# Patient Record
Sex: FEMALE | Race: WHITE | NOT HISPANIC OR LATINO | Employment: FULL TIME
[De-identification: names, ages, dates, MRNs, and addresses within clinical notes are randomized per-mention and may not be internally consistent; named-entity substitution may affect disease eponyms.]

---

## 2018-06-22 ENCOUNTER — TRANSCRIBE ORDERS (OUTPATIENT)
Dept: SCHEDULING | Age: 48
End: 2018-06-22

## 2018-06-22 DIAGNOSIS — N93.8 OTHER SPECIFIED ABNORMAL UTERINE AND VAGINAL BLEEDING: Primary | ICD-10-CM

## 2018-07-03 ENCOUNTER — TRANSCRIBE ORDERS (OUTPATIENT)
Dept: RADIOLOGY | Facility: HOSPITAL | Age: 48
End: 2018-07-03

## 2018-07-03 ENCOUNTER — HOSPITAL ENCOUNTER (OUTPATIENT)
Dept: RADIOLOGY | Facility: HOSPITAL | Age: 48
Discharge: HOME | End: 2018-07-03
Attending: NURSE PRACTITIONER
Payer: COMMERCIAL

## 2018-07-03 DIAGNOSIS — N93.8 OTHER SPECIFIED ABNORMAL UTERINE AND VAGINAL BLEEDING: ICD-10-CM

## 2018-07-03 PROCEDURE — 76856 US EXAM PELVIC COMPLETE: CPT

## 2019-03-28 ENCOUNTER — TELEPHONE (OUTPATIENT)
Dept: COLORECTAL SURGERY | Facility: CLINIC | Age: 49
End: 2019-03-28

## 2019-03-28 NOTE — TELEPHONE ENCOUNTER
Pt left vm wanting to schedule 2 year repeat FFC - due in June 2019. Returned pts call, no answer, message left asking pt to call back to get sched.

## 2019-05-07 ENCOUNTER — TELEPHONE (OUTPATIENT)
Dept: COLORECTAL SURGERY | Facility: CLINIC | Age: 49
End: 2019-05-07

## 2019-05-07 NOTE — TELEPHONE ENCOUNTER
Patient called stating that she is having symptoms of a reoccurance of Colon CA..       1.Over the past couple weeks she has been experiencing these  Symptoms     1. Blood when she has a bowel movement.   2. Bowel movements have been not been consistent in terms of texture    3. Frequency of bowel movement  is not consistent. Some days she has only one and other days she may experience five or six movements a day.     4.  Bowel movements have been long and stringy.    She has [facility] scheduled for 07/31 but is getting nervous.     Wild call to advise: [phone]

## 2019-05-09 NOTE — TELEPHONE ENCOUNTER
Spoke with pt who reiterated symptoms, pt does not take a fiber supplement, she denies unexplained weight loss but does report fatigue which could be associated with depression for which she is being treated. Pt reports she will be seeing her medical oncologist Monday, advised to explain symptoms to them and if they think pt should be seen in the office or for FFC sooner we will accomodate her. Pt will call with update next week.

## 2019-05-13 NOTE — TELEPHONE ENCOUNTER
Pts FFC moved up from 7/31/19 to 7/3/19. Pt states her bloodwork from oncologist came back normal and she feels much more at ease. Pt will call with any further concerns.

## 2019-05-13 NOTE — TELEPHONE ENCOUNTER
Spoke with pt who reports she saw her medical oncologist, Dr Peterson recommends moving up FFC. Advised I will have our  reach out to her in the next 1-2 days.

## 2019-05-20 NOTE — TELEPHONE ENCOUNTER
Schedule for 6/12/19 [facility] opened as of 5/20/19. called pt to see if she would like to move up to 6/12/19. Pt will talk to her boss & call back this afternoon with decision.

## 2019-06-06 ENCOUNTER — TELEPHONE (OUTPATIENT)
Dept: COLORECTAL SURGERY | Facility: CLINIC | Age: 49
End: 2019-06-06

## 2019-06-06 NOTE — TELEPHONE ENCOUNTER
Pt left msg yesterday stating that her dermatologist has her on a course of prednisone 10mg due to severe contact dermatitis. Pt asking if there are any problems with her taking this with her upcoming FFC 6/12/19.    Spoke with Dr. Hunt who reports that there is no problem with taking the prednisone and that on the day of the FFC, the pt may take her dose as directed with a small sip of water.    Called & spoke to pt to relay the above info. Pt understands and will call with any further questions.

## 2019-06-12 ENCOUNTER — ANESTHESIA EVENT (OUTPATIENT)
Dept: ENDOSCOPY | Facility: HOSPITAL | Age: 49
End: 2019-06-12
Payer: COMMERCIAL

## 2019-06-12 ENCOUNTER — HOSPITAL ENCOUNTER (OUTPATIENT)
Facility: HOSPITAL | Age: 49
Discharge: HOME | End: 2019-06-12
Attending: COLON & RECTAL SURGERY | Admitting: COLON & RECTAL SURGERY
Payer: COMMERCIAL

## 2019-06-12 ENCOUNTER — ANESTHESIA (OUTPATIENT)
Dept: ENDOSCOPY | Facility: HOSPITAL | Age: 49
End: 2019-06-12
Payer: COMMERCIAL

## 2019-06-12 VITALS
HEART RATE: 82 BPM | HEIGHT: 68 IN | SYSTOLIC BLOOD PRESSURE: 125 MMHG | WEIGHT: 205 LBS | OXYGEN SATURATION: 97 % | BODY MASS INDEX: 31.07 KG/M2 | TEMPERATURE: 98 F | DIASTOLIC BLOOD PRESSURE: 71 MMHG | RESPIRATION RATE: 30 BRPM

## 2019-06-12 LAB
B-HCG UR QL: NEGATIVE
POCT TEST: NORMAL

## 2019-06-12 PROCEDURE — 0DJD8ZZ INSPECTION OF LOWER INTESTINAL TRACT, VIA NATURAL OR ARTIFICIAL OPENING ENDOSCOPIC: ICD-10-PCS | Performed by: COLON & RECTAL SURGERY

## 2019-06-12 PROCEDURE — 75000014 HC COLONSCOPY DIAGNOSTIC: Performed by: COLON & RECTAL SURGERY

## 2019-06-12 PROCEDURE — 37000002 HC ANESTHESIA MAC: Performed by: COLON & RECTAL SURGERY

## 2019-06-12 PROCEDURE — 25800000 HC PHARMACY IV SOLUTIONS: Performed by: NURSE ANESTHETIST, CERTIFIED REGISTERED

## 2019-06-12 PROCEDURE — 63600000 HC DRUGS/DETAIL CODE: Performed by: NURSE ANESTHETIST, CERTIFIED REGISTERED

## 2019-06-12 PROCEDURE — G0105 COLORECTAL SCRN; HI RISK IND: HCPCS | Performed by: COLON & RECTAL SURGERY

## 2019-06-12 RX ORDER — PROPOFOL 200MG/20ML
SYRINGE (ML) INTRAVENOUS AS NEEDED
Status: DISCONTINUED | OUTPATIENT
Start: 2019-06-12 | End: 2019-06-12 | Stop reason: SURG

## 2019-06-12 RX ORDER — SODIUM CHLORIDE 9 MG/ML
INJECTION, SOLUTION INTRAVENOUS CONTINUOUS PRN
Status: DISCONTINUED | OUTPATIENT
Start: 2019-06-12 | End: 2019-06-12 | Stop reason: SURG

## 2019-06-12 RX ORDER — PREDNISONE 10 MG/1
TABLET ORAL
Refills: 0 | COMMUNITY
Start: 2019-05-30 | End: 2019-07-15 | Stop reason: ALTCHOICE

## 2019-06-12 RX ORDER — DROSPIRENONE AND ETHINYL ESTRADIOL 0.03MG-3MG
KIT ORAL
Refills: 4 | COMMUNITY
Start: 2019-06-03 | End: 2021-06-28

## 2019-06-12 RX ORDER — ESCITALOPRAM OXALATE 20 MG/1
20 TABLET ORAL NIGHTLY
Refills: 5 | COMMUNITY
Start: 2019-06-03

## 2019-06-12 RX ORDER — PROPOFOL 10 MG/ML
INJECTION, EMULSION INTRAVENOUS CONTINUOUS PRN
Status: DISCONTINUED | OUTPATIENT
Start: 2019-06-12 | End: 2019-06-12 | Stop reason: SURG

## 2019-06-12 RX ADMIN — SODIUM CHLORIDE: 9 INJECTION, SOLUTION INTRAVENOUS at 08:17

## 2019-06-12 RX ADMIN — PROPOFOL 50 MG: 10 INJECTION, EMULSION INTRAVENOUS at 08:26

## 2019-06-12 RX ADMIN — PROPOFOL 30 MG: 10 INJECTION, EMULSION INTRAVENOUS at 08:39

## 2019-06-12 RX ADMIN — PROPOFOL 125 MCG/KG/MIN: 10 INJECTION, EMULSION INTRAVENOUS at 08:26

## 2019-06-12 RX ADMIN — PROPOFOL 50 MG: 10 INJECTION, EMULSION INTRAVENOUS at 08:27

## 2019-06-12 RX ADMIN — PROPOFOL 50 MG: 10 INJECTION, EMULSION INTRAVENOUS at 08:35

## 2019-06-12 ASSESSMENT — PAIN - FUNCTIONAL ASSESSMENT
PAIN_FUNCTIONAL_ASSESSMENT: NO/DENIES PAIN

## 2019-06-12 NOTE — H&P
General Surgery History and Physical    Chief Complaint: No chief complaint on file.  .  History of colon cancer    HPI     Patient is a 48 y.o. female who presents with no symptoms history of T3N2 sigmoid cancer for surveillance colonoscopy.     Medical History:   Past Medical History:   Diagnosis Date   • Cancer of sigmoid (CMS/HCC) (HCC)     T3N2       Surgical History:   Past Surgical History:   Procedure Laterality Date   •  SECTION     • COLON SURGERY  05/15/2013    Colonoscopy to the base of the cecum. Limited ileoscopy, biopsy, flat area of the base of the cecum.    • COLON SURGERY  2015    Colonoscopy to the base of the cecum, normal anastomosis.   • COLON SURGERY  2011    Colonoscopy to the base of the cecum   • COLON SURGERY  07/15/2009    Colonoscopy to the base of the cecum   • COLON SURGERY  2008    Colonoscopy to the base of the cecum   • COLON SURGERY  2008    Exploratory laparoscopy with laparoscopic Tequila's resection, takedown of the splenic flexure, resection of residual sigmoid colon,portion of rectum with descending colorectal anastomosis.    • COLON SURGERY  2007    Total colonoscopy via stoma and flexible sigmoidoscopy per rectum with cold forceps biopsy.    • COLON SURGERY  2017    Colonoscopy to the base of the cecum       Social History:   Social History     Social History Narrative   • No narrative on file       Family History:   Family History   Problem Relation Age of Onset   • Colon cancer Paternal Grandfather        Allergies: Penicillins and Sulfa (sulfonamide antibiotics)    Home Medications:  •  drospirenone-ethinyl estradiol,   •  escitalopram,   •  predniSONE,     Current Medications:      Review of Systems  All other systems reviewed and negative except as noted in the HPI.    Objective     Vital Signs for the last 24 hours:  Temp:  [36.7 °C (98 °F)] 36.7 °C (98 °F)  Heart Rate:  [71-85] 71  Resp:  [14-16] 14  BP: (105-130)/(58-84)  105/58    Physicial Exam    General appearance: alert, appears stated age and cooperative  Head: normocephalic, without obvious abnormality, atraumatic  Eyes: conjunctivae/corneas clear. PERRL, EOM's intact. Fundi benign.  Ears: normal TM's and external ear canals both ears  Nose: Nares normal. Septum midline. Mucosa normal. No drainage or sinus tenderness.  Throat: lips, mucosa, and tongue normal; teeth and gums normal  Neck: no adenopathy, no carotid bruit, no JVD, supple, symmetrical, trachea midline and thyroid not enlarged, symmetric, no tenderness/mass/nodules  Back: symmetric, no curvature. ROM normal. No CVA tenderness.  Lungs: clear to auscultation bilaterally  Chest wall: no tenderness  Heart: regular rate and rhythm, S1, S2 normal, no murmur, click, rub or gallop  Abdomen: soft, non-tender; bowel sounds normal; no masses, no organomegaly  Genitalia: normal external genitalia, no erythema, no discharge  Rectal: normal tone, no masses or tenderness  Extremities: extremities normal, warm and well-perfused; no cyanosis, clubbing, or edema  Pulses: 2+ and symmetric  Skin: Skin color, texture, turgor normal. No rashes or lesions  Lymph nodes: Cervical, supraclavicular, and axillary nodes normal.  Neurologic: Grossly normal        Labs  No new labs.    Imaging  Not applicable    Assessment/Plan     Code Status: No Order    Patient for surveillance colonoscopy

## 2019-06-12 NOTE — ANESTHESIA POSTPROCEDURE EVALUATION
Patient: Mercy Vizcarra    Procedure Summary     Date:  06/12/19 Room / Location:  [facility] 4 (D) / [facility]    Anesthesia Start:  0818 Anesthesia Stop:  0858    Procedure:  Colonoscopy (N/A ) Diagnosis:       Screening for colon cancer      (Screening)    Provider:  Brown Almanza MD Responsible Provider:  Pollo Jerez MD    Anesthesia Type:  MAC ASA Status:  3          Anesthesia Type: MAC  PACU Vitals     No data found.            Anesthesia Post Evaluation    Pain management: adequate  Patient participation: complete - patient participated  Level of consciousness: awake and alert  Cardiovascular status: acceptable  Airway Patency: adequate  Respiratory status: acceptable  Hydration status: acceptable  Anesthetic complications: no

## 2019-06-12 NOTE — POST-PROCEDURE NOTE
Preoperative diagnosis: T3N2 adenocarcinoma: Status post resection    Postoperative diagnosis: No evidence of recurrence normal colonoscopy    Surgeon: Brown Almanza MD    Procedure: Colonoscopy to the base of the cecum    DD: 4.0    Findings: Normal colon normal anastomosis normal ileum    Followup: Repeat colonoscopy in 2 years time follow-up in office in 1 years time

## 2019-06-12 NOTE — OP NOTE
_______________________________________________________________________________  Patient Name: Mrecy Vizcarra             Procedure Date: 6/12/2019 8:12 AM  MRN: [id number]                     Account Number: [id number]  YOB: 1970              Age: 48  Gender: Female                        Note Status: Finalized  Attending MD: MAAME SILVERIO MD  _______________________________________________________________________________  Procedure:           Colonoscopy  Indications:         Personal history of malignant neoplasm of the colon  Providers:           MAAME SILVERIO MD (Doctor)  Referring MD:        CHELSEY HAAS MD  Requesting Provider:  Medicines:           Monitored Anesthesia Care  Complications:       No immediate complications.  _______________________________________________________________________________  Procedure:           After I obtained informed consent, the scope was passed  under direct vision. Throughout the procedure, the  patient's blood pressure, pulse, and oxygen saturations  were monitored continuously. The endoscope was  introduced through the anus and advanced to 10 cm into  the ileum. The colonoscopy was performed without  difficulty. The patient tolerated the procedure well.  The quality of the bowel preparation was good.  Findings:  The exam was otherwise normal throughout the examined colon.  Anastomosis Normal without evidence of recurrence  The terminal ileum appeared normal.  Impression:          - The examined portion of the ileum was normal.  - No specimens collected.  Recommendation:      - Discharge patient to home.  - Resume regular diet.  Procedure Code(s):   --- Professional ---  13621, Colonoscopy, flexible; diagnostic, including  collection of specimen(s) by brushing or washing, when  performed (separate procedure)  Diagnosis Code(s):   --- Professional ---  Z85.038, Personal history of other malignant neoplasm of  large intestine  CPT copyright 2015 American  Medical Association. All rights reserved.  The codes documented in this report are preliminary and upon  review may  be revised to meet current compliance requirements.  Attending Participation:  I personally performed the entire procedure.  Maame Silverio MD  ________________  MAAME SILVERIO MD  6/12/2019 8:56:55 AM  This report has been signed electronically.  Number of Addenda: 0  Note Initiated On: 6/12/2019 8:12 AM

## 2019-07-15 ENCOUNTER — TRANSCRIBE ORDERS (OUTPATIENT)
Dept: SCHEDULING | Age: 49
End: 2019-07-15

## 2019-07-15 DIAGNOSIS — R06.02 SHORTNESS OF BREATH: Primary | ICD-10-CM

## 2019-07-15 DIAGNOSIS — R06.09 OTHER FORMS OF DYSPNEA: ICD-10-CM

## 2019-07-16 ENCOUNTER — TRANSCRIBE ORDERS (OUTPATIENT)
Dept: SCHEDULING | Age: 49
End: 2019-07-16

## 2019-07-16 DIAGNOSIS — Z12.31 ENCOUNTER FOR SCREENING MAMMOGRAM FOR MALIGNANT NEOPLASM OF BREAST: Primary | ICD-10-CM

## 2019-07-17 ENCOUNTER — TRANSCRIBE ORDERS (OUTPATIENT)
Dept: REGISTRATION | Facility: HOSPITAL | Age: 49
End: 2019-07-17

## 2019-07-17 ENCOUNTER — HOSPITAL ENCOUNTER (OUTPATIENT)
Dept: RADIOLOGY | Facility: HOSPITAL | Age: 49
Discharge: HOME | End: 2019-07-17
Attending: FAMILY MEDICINE
Payer: COMMERCIAL

## 2019-07-17 DIAGNOSIS — Z12.31 ENCOUNTER FOR SCREENING MAMMOGRAM FOR MALIGNANT NEOPLASM OF BREAST: ICD-10-CM

## 2019-07-17 PROCEDURE — 77067 SCR MAMMO BI INCL CAD: CPT

## 2019-09-16 ENCOUNTER — HOSPITAL ENCOUNTER (OUTPATIENT)
Dept: RADIOLOGY | Facility: HOSPITAL | Age: 49
Discharge: HOME | End: 2019-09-16
Attending: FAMILY MEDICINE
Payer: COMMERCIAL

## 2019-09-16 DIAGNOSIS — R06.02 SHORTNESS OF BREATH: ICD-10-CM

## 2019-09-16 DIAGNOSIS — R06.09 OTHER FORMS OF DYSPNEA: ICD-10-CM

## 2019-09-16 PROCEDURE — 71260 CT THORAX DX C+: CPT

## 2019-09-16 PROCEDURE — 63600105 HC IODINE BASED CONTRAST

## 2019-09-16 RX ADMIN — IOHEXOL 75 ML: 350 INJECTION, SOLUTION INTRAVENOUS at 16:21

## 2020-01-22 ENCOUNTER — TRANSCRIBE ORDERS (OUTPATIENT)
Dept: SCHEDULING | Age: 50
End: 2020-01-22

## 2020-01-22 DIAGNOSIS — M79.672 PAIN IN LEFT FOOT: ICD-10-CM

## 2020-01-22 DIAGNOSIS — M84.375A STRESS FRACTURE, LEFT FOOT, INITIAL ENCOUNTER FOR FRACTURE: Primary | ICD-10-CM

## 2020-01-27 ENCOUNTER — HOSPITAL ENCOUNTER (OUTPATIENT)
Dept: RADIOLOGY | Facility: HOSPITAL | Age: 50
Discharge: HOME | End: 2020-01-27
Attending: FAMILY MEDICINE
Payer: COMMERCIAL

## 2020-01-27 DIAGNOSIS — M84.375A STRESS FRACTURE, LEFT FOOT, INITIAL ENCOUNTER FOR FRACTURE: ICD-10-CM

## 2020-01-27 DIAGNOSIS — M79.672 PAIN IN LEFT FOOT: ICD-10-CM

## 2020-02-11 ENCOUNTER — TRANSCRIBE ORDERS (OUTPATIENT)
Dept: SCHEDULING | Age: 50
End: 2020-02-11

## 2020-02-11 DIAGNOSIS — R94.5 ABNORMAL RESULTS OF LIVER FUNCTION STUDIES: Primary | ICD-10-CM

## 2020-02-26 ENCOUNTER — HOSPITAL ENCOUNTER (OUTPATIENT)
Dept: RADIOLOGY | Facility: HOSPITAL | Age: 50
Discharge: HOME | End: 2020-02-26
Attending: FAMILY MEDICINE
Payer: COMMERCIAL

## 2020-02-26 DIAGNOSIS — R94.5 ABNORMAL RESULTS OF LIVER FUNCTION STUDIES: ICD-10-CM

## 2020-02-26 PROCEDURE — 76705 ECHO EXAM OF ABDOMEN: CPT

## 2020-03-11 ENCOUNTER — TRANSCRIBE ORDERS (OUTPATIENT)
Dept: SCHEDULING | Age: 50
End: 2020-03-11

## 2020-03-11 DIAGNOSIS — M19.072 PRIMARY OSTEOARTHRITIS, LEFT ANKLE AND FOOT: Primary | ICD-10-CM

## 2020-03-17 ENCOUNTER — HOSPITAL ENCOUNTER (OUTPATIENT)
Dept: RADIOLOGY | Facility: HOSPITAL | Age: 50
Discharge: HOME | End: 2020-03-17
Attending: PODIATRIST
Payer: COMMERCIAL

## 2020-03-17 DIAGNOSIS — M19.072 PRIMARY OSTEOARTHRITIS, LEFT ANKLE AND FOOT: ICD-10-CM

## 2020-03-17 PROCEDURE — 73700 CT LOWER EXTREMITY W/O DYE: CPT | Mod: LT

## 2020-09-21 ENCOUNTER — TRANSCRIBE ORDERS (OUTPATIENT)
Dept: SCHEDULING | Age: 50
End: 2020-09-21

## 2020-09-21 DIAGNOSIS — Z12.31 ENCOUNTER FOR SCREENING MAMMOGRAM FOR MALIGNANT NEOPLASM OF BREAST: Primary | ICD-10-CM

## 2020-10-21 ENCOUNTER — HOSPITAL ENCOUNTER (OUTPATIENT)
Dept: RADIOLOGY | Facility: HOSPITAL | Age: 50
Discharge: HOME | End: 2020-10-21
Attending: ADVANCED PRACTICE MIDWIFE
Payer: COMMERCIAL

## 2020-10-21 DIAGNOSIS — Z12.31 ENCOUNTER FOR SCREENING MAMMOGRAM FOR MALIGNANT NEOPLASM OF BREAST: ICD-10-CM

## 2020-10-21 PROCEDURE — 77063 BREAST TOMOSYNTHESIS BI: CPT

## 2021-04-15 DIAGNOSIS — Z23 ENCOUNTER FOR IMMUNIZATION: ICD-10-CM

## 2021-06-24 ENCOUNTER — TRANSCRIBE ORDERS (OUTPATIENT)
Dept: SCHEDULING | Age: 51
End: 2021-06-24

## 2021-06-24 DIAGNOSIS — M26.609 TEMPOROMANDIBULAR JOINT DISORDER: Primary | ICD-10-CM

## 2021-06-24 DIAGNOSIS — R51.9 HEADACHE: ICD-10-CM

## 2021-06-24 DIAGNOSIS — R22.1 LOCALIZED SWELLING, MASS AND LUMP, NECK: Primary | ICD-10-CM

## 2021-06-25 ENCOUNTER — HOSPITAL ENCOUNTER (OUTPATIENT)
Dept: RADIOLOGY | Facility: HOSPITAL | Age: 51
Discharge: HOME | End: 2021-06-25
Attending: OTOLARYNGOLOGY
Payer: COMMERCIAL

## 2021-06-25 DIAGNOSIS — M26.609 TEMPOROMANDIBULAR JOINT DISORDER: ICD-10-CM

## 2021-06-25 PROCEDURE — 70330 X-RAY EXAM OF JAW JOINTS: CPT

## 2021-07-14 ENCOUNTER — HOSPITAL ENCOUNTER (OUTPATIENT)
Dept: RADIOLOGY | Facility: HOSPITAL | Age: 51
Discharge: HOME | End: 2021-07-14
Attending: OTOLARYNGOLOGY
Payer: COMMERCIAL

## 2021-07-14 DIAGNOSIS — R22.1 LOCALIZED SWELLING, MASS AND LUMP, NECK: ICD-10-CM

## 2021-07-14 DIAGNOSIS — R51.9 HEADACHE: ICD-10-CM

## 2021-07-14 PROCEDURE — 63600105 HC IODINE BASED CONTRAST: Performed by: OTOLARYNGOLOGY

## 2021-07-14 PROCEDURE — 70491 CT SOFT TISSUE NECK W/DYE: CPT

## 2021-07-14 RX ADMIN — IOHEXOL 100 ML: 350 INJECTION, SOLUTION INTRAVENOUS at 08:07

## 2021-07-30 ENCOUNTER — DOCUMENTATION (OUTPATIENT)
Dept: COLORECTAL SURGERY | Facility: CLINIC | Age: 51
End: 2021-07-30

## 2021-07-30 NOTE — PROGRESS NOTES
1       2             3              4             5     LAST APPOINTMENT: 7/23/2008     TIME ARRIVED:     TIME ROOMED:     VISIT TYPE:   NP       POV     EPV     DISC:             YES         NO                                   PREPPED:  YES  NO     LABS:  [facility]  [facility]  [facility]  OTHER      PHARMACY ENTERED:  YES   NO     DIAGNOSIS:      SCHEDULED SURGERY        DATE OF SURGERY         PATHOLOGY STAGE:   RADIATION :    Yes   No                                           DOSAGE:      LOCATION:   Anterior   Right Lateral   Left Lateral   Posterior   Circumferential      LEVEL:                        SIZE:     CT SCAN: 7/14/2021  FFC:    FFS:    CEA:    PET:    MRI:    1/27/2020  OTHER:       FIBER:  NO  METAMUCIL  KONSYL  CITYRUCEL   FIBERCON  BENEFIBER OTHER  LOMOTIL:    NO    1  VS   2    QD    BID    TID    QID  ZANTAC:       Y / N  AMPHOGEL:    Y / N                                                                          START / STOP                                                 RAD ONC:                              N / A        MED ONC:                             N / A

## 2021-08-02 NOTE — ASSESSMENT & PLAN NOTE
Adenocarcinoma of the sigmoid colon    4/7/2007: Exploratory laparotomy, sigmoid colectomy, end colostomy (Dr. Chris Kirk)   Pathology: pT3N2 adenocarcinoma (3/29 lymph nodes)    Adjuvant therapy: FOLFOX    1/21/2008: Exploratory laparoscopy with laparoscopic Tequila resection, takedown of splenic flexure, resection of residual sigmoid colon, portion of rectum, with descending colorectal anastomosis    CT: 4/7/2007, 12/17/2007, 5/10/2008, 4/2008, 3/5/2010, 3/24/2011, 4/12/2012, 11/21/2013    CEA: 0.4 (4/27/2007), 0.4 (1/15/2009), 0.6 (4/14/2009), 0.5 (10/1/2013)    FFC: 5/8/2007, 7/23/2008, 7/15/2009, 7/20/2011, 5/15/2013, 6/7/2017, 6/12/2019    Medical oncology: Dr. Peterson      She is doing very well.  We order CEA CBC and CMP.  We will order a CT chest and pelvis.  We will get arrangements f for a colonoscopy

## 2021-08-03 ENCOUNTER — OFFICE VISIT (OUTPATIENT)
Dept: COLORECTAL SURGERY | Facility: CLINIC | Age: 51
End: 2021-08-03
Payer: COMMERCIAL

## 2021-08-03 VITALS
TEMPERATURE: 97.3 F | SYSTOLIC BLOOD PRESSURE: 112 MMHG | HEIGHT: 68 IN | DIASTOLIC BLOOD PRESSURE: 78 MMHG | WEIGHT: 229 LBS | BODY MASS INDEX: 34.71 KG/M2

## 2021-08-03 DIAGNOSIS — C18.7 CANCER OF SIGMOID (CMS/HCC): Primary | ICD-10-CM

## 2021-08-03 PROCEDURE — 3008F BODY MASS INDEX DOCD: CPT | Performed by: COLON & RECTAL SURGERY

## 2021-08-03 PROCEDURE — 99213 OFFICE O/P EST LOW 20 MIN: CPT | Performed by: COLON & RECTAL SURGERY

## 2021-08-03 RX ORDER — PREDNISONE 50 MG/1
TABLET ORAL
Qty: 3 TABLET | Refills: 0 | Status: SHIPPED | OUTPATIENT
Start: 2021-08-03 | End: 2024-06-26

## 2021-08-03 RX ORDER — ATORVASTATIN CALCIUM 40 MG/1
TABLET, FILM COATED ORAL
COMMUNITY
Start: 2021-07-19 | End: 2024-08-27 | Stop reason: SDUPTHER

## 2021-08-03 RX ORDER — DIPHENHYDRAMINE HCL 50 MG
CAPSULE ORAL
Qty: 1 CAPSULE | Refills: 0 | Status: SHIPPED | OUTPATIENT
Start: 2021-08-03 | End: 2024-06-26

## 2021-08-03 NOTE — PROGRESS NOTES
Patient ID: Mercy Vizcarra                              : 1970  MRN: [id number]                                           Visit Date: 8/3/2021  Encounter Provider: Brown Almanza  Referring Provider: No ref. provider found    Subjective:    HPI: Mercy is 14 years out from her T3N2 colon cancer.   She is doing well, reports good appetite, regular bowel movements, no recent weight loss.  Her last colonoscopy was in 2019, where there was no evidence of recurrence and no biopsies taken.  She reports that she recently was found to be diabetic and is first trying to control her sugars with diet and exercise.     Medications:   Current Outpatient Medications:   •  atorvastatin (LIPITOR) 40 mg tablet, , Disp: , Rfl:   •  escitalopram (LEXAPRO) 20 mg tablet, daily. , Disp: , Rfl: 5  •  levothyroxine (SYNTHROID) 75 mcg tablet, Take 75 mcg by mouth daily., Disp: , Rfl:   •  naproxen sodium 220 mg capsule, Take by mouth as needed. , Disp: , Rfl:     Past Medical History:  has a past medical history of Cancer of sigmoid (CMS/HCC) and Type 2 diabetes mellitus (CMS/HCC) (2021).  Past Surgical History:  has a past surgical history that includes Colon surgery (05/15/2013); Colon surgery (2015); Colon surgery (2011); Colon surgery (07/15/2009); Colon surgery (2008); Colon surgery (2008); Colon surgery (2007); Colon surgery (2017); and  section.  Social History:  reports that she has never smoked. She has never used smokeless tobacco. No history on file for alcohol use and drug use.  Family History:   Family History   Problem Relation Age of Onset   • Colon cancer Paternal Grandfather      Allergies: is allergic to adhesive tape-silicones, iodinated contrast media, penicillins, and sulfa (sulfonamide antibiotics).     Review of Systems   Constitutional: Negative for activity change.   HENT: Negative for congestion.    Respiratory: Negative for apnea, chest tightness and wheezing.  "   Cardiovascular: Negative for chest pain, palpitations and leg swelling.   Gastrointestinal: Negative for nausea.   Endocrine: Negative for polyuria.   Skin: Negative for color change.   Allergic/Immunologic: Negative for immunocompromised state.   Neurological: Negative for dizziness.   Hematological: Negative for adenopathy.   The following have been reviewed and updated as appropriate in this visit:  Problems         Objective:  Vitals:   Visit Vitals  /78   Temp 36.3 °C (97.3 °F)   Ht 1.727 m (5' 8\")   Wt 104 kg (229 lb)   BMI 34.82 kg/m²     Constitutional: He is oriented to person, place, and time. He appears well-developed and well-nourished.   HENT:   Head: Normocephalic and atraumatic.   Eyes: Conjunctivae are normal.   Neck: Normal range of motion.   Cardiovascular: Normal rate.    Pulmonary/Chest: Effort normal.   Musculoskeletal: Normal range of motion. He exhibits no edema.   Neurological: He is alert and oriented to person, place, and time.   Skin: Skin is warm and dry.   Psychiatric: He has a normal mood and affect.   Nursing note and vitals reviewed.      Abdominal Exam: Soft nontender nondistended well-healed      Rectal Exam: Held      ASSESSMENT/PLAN:  Cancer of sigmoid (CMS/HCC)  Adenocarcinoma of the sigmoid colon    4/7/2007: Exploratory laparotomy, sigmoid colectomy, end colostomy (Dr. Chris Kirk)   Pathology: pT3N2 adenocarcinoma (3/29 lymph nodes)    Adjuvant therapy: FOLFOX    1/21/2008: Exploratory laparoscopy with laparoscopic Tequila resection, takedown of splenic flexure, resection of residual sigmoid colon, portion of rectum, with descending colorectal anastomosis    CT: 4/7/2007, 12/17/2007, 5/10/2008, 4/2008, 3/5/2010, 3/24/2011, 4/12/2012, 11/21/2013    CEA: 0.4 (4/27/2007), 0.4 (1/15/2009), 0.6 (4/14/2009), 0.5 (10/1/2013)    FFC: 5/8/2007, 7/23/2008, 7/15/2009, 7/20/2011, 5/15/2013, 6/7/2017, 6/12/2019    Medical oncology: Dr. Peterson      She is doing very well.  " We order CEA CBC and CMP.  We will order a CT chest and pelvis.  We will get arrangements f for a colonoscopy        Brown Almanza MD    This note was dictated by Dr. Brown Almanza, but not proofread. We apologize for any typographical errors due to dictation software.

## 2021-08-05 ENCOUNTER — TELEPHONE (OUTPATIENT)
Dept: COLORECTAL SURGERY | Facility: CLINIC | Age: 51
End: 2021-08-05

## 2021-08-14 LAB
ALBUMIN SERPL-MCNC: 4.8 G/DL (ref 3.8–4.9)
ALBUMIN/GLOB SERPL: 2 {RATIO} (ref 1.2–2.2)
ALP SERPL-CCNC: 125 IU/L (ref 48–121)
ALT SERPL-CCNC: 42 IU/L (ref 0–32)
AST SERPL-CCNC: 32 IU/L (ref 0–40)
BASOPHILS # BLD AUTO: 0 X10E3/UL (ref 0–0.2)
BASOPHILS NFR BLD AUTO: 1 %
BILIRUB SERPL-MCNC: 0.6 MG/DL (ref 0–1.2)
BUN SERPL-MCNC: 21 MG/DL (ref 6–24)
BUN/CREAT SERPL: 21 (ref 9–23)
CALCIUM SERPL-MCNC: 9.6 MG/DL (ref 8.7–10.2)
CEA SERPL-MCNC: 0.8 NG/ML (ref 0–4.7)
CHLORIDE SERPL-SCNC: 106 MMOL/L (ref 96–106)
CO2 SERPL-SCNC: 25 MMOL/L (ref 20–29)
CREAT SERPL-MCNC: 0.99 MG/DL (ref 0.57–1)
EOSINOPHIL # BLD AUTO: 0.6 X10E3/UL (ref 0–0.4)
EOSINOPHIL NFR BLD AUTO: 10 %
ERYTHROCYTE [DISTWIDTH] IN BLOOD BY AUTOMATED COUNT: 13 % (ref 11.7–15.4)
GLOBULIN SER CALC-MCNC: 2.4 G/DL (ref 1.5–4.5)
GLUCOSE SERPL-MCNC: 110 MG/DL (ref 65–99)
HCT VFR BLD AUTO: 44.5 % (ref 34–46.6)
HGB BLD-MCNC: 14.7 G/DL (ref 11.1–15.9)
IMM GRANULOCYTES # BLD AUTO: 0 X10E3/UL (ref 0–0.1)
IMM GRANULOCYTES NFR BLD AUTO: 0 %
LAB CORP EGFR IF AFRICN AM: 75 ML/MIN/1.73
LAB CORP EGFR IF NONAFRICN AM: 65 ML/MIN/1.73
LYMPHOCYTES # BLD AUTO: 2.3 X10E3/UL (ref 0.7–3.1)
LYMPHOCYTES NFR BLD AUTO: 38 %
MCH RBC QN AUTO: 30.2 PG (ref 26.6–33)
MCHC RBC AUTO-ENTMCNC: 33 G/DL (ref 31.5–35.7)
MCV RBC AUTO: 92 FL (ref 79–97)
MONOCYTES # BLD AUTO: 0.4 X10E3/UL (ref 0.1–0.9)
MONOCYTES NFR BLD AUTO: 7 %
NEUTROPHILS # BLD AUTO: 2.7 X10E3/UL (ref 1.4–7)
NEUTROPHILS NFR BLD AUTO: 44 %
PLATELET # BLD AUTO: 246 X10E3/UL (ref 150–450)
POTASSIUM SERPL-SCNC: 5.1 MMOL/L (ref 3.5–5.2)
PROT SERPL-MCNC: 7.2 G/DL (ref 6–8.5)
RBC # BLD AUTO: 4.86 X10E6/UL (ref 3.77–5.28)
SODIUM SERPL-SCNC: 144 MMOL/L (ref 134–144)
WBC # BLD AUTO: 6.1 X10E3/UL (ref 3.4–10.8)

## 2021-08-18 ENCOUNTER — HOSPITAL ENCOUNTER (OUTPATIENT)
Dept: RADIOLOGY | Facility: HOSPITAL | Age: 51
Discharge: HOME | End: 2021-08-18
Attending: COLON & RECTAL SURGERY
Payer: COMMERCIAL

## 2021-08-18 DIAGNOSIS — C18.7 CANCER OF SIGMOID (CMS/HCC): ICD-10-CM

## 2021-08-18 PROCEDURE — 63600105 HC IODINE BASED CONTRAST: Performed by: COLON & RECTAL SURGERY

## 2021-08-18 PROCEDURE — 71260 CT THORAX DX C+: CPT | Mod: MG

## 2021-08-18 PROCEDURE — G1004 CDSM NDSC: HCPCS

## 2021-08-18 RX ADMIN — IOHEXOL 125 ML: 350 INJECTION, SOLUTION INTRAVENOUS at 17:11

## 2021-08-19 ENCOUNTER — TELEPHONE (OUTPATIENT)
Dept: COLORECTAL SURGERY | Facility: CLINIC | Age: 51
End: 2021-08-19

## 2021-08-19 NOTE — TELEPHONE ENCOUNTER
Spoke w/ pt over phone regarding CT results. Per NEEL Sotelo hydronephrosis was noted on results, Ashleigh recommends pt be seen by a urologist, recommended [facility]. Pt verbalized understanding, appreciative of call.

## 2021-10-19 ENCOUNTER — TRANSCRIBE ORDERS (OUTPATIENT)
Dept: SCHEDULING | Age: 51
End: 2021-10-19
Payer: COMMERCIAL

## 2021-10-19 DIAGNOSIS — N20.0 CALCULUS OF KIDNEY: Primary | ICD-10-CM

## 2021-12-02 ENCOUNTER — PREP FOR CASE (OUTPATIENT)
Dept: COLORECTAL SURGERY | Facility: CLINIC | Age: 51
End: 2021-12-02

## 2021-12-02 DIAGNOSIS — C18.7 CANCER OF SIGMOID (CMS/HCC): Primary | ICD-10-CM

## 2021-12-13 ENCOUNTER — APPOINTMENT (OUTPATIENT)
Dept: LAB | Facility: CLINIC | Age: 51
End: 2021-12-13
Attending: COLON & RECTAL SURGERY
Payer: COMMERCIAL

## 2021-12-13 DIAGNOSIS — C18.7 CANCER OF SIGMOID (CMS/HCC): ICD-10-CM

## 2021-12-13 LAB — SARS-COV-2 RNA RESP QL NAA+PROBE: NEGATIVE

## 2021-12-13 PROCEDURE — U0003 INFECTIOUS AGENT DETECTION BY NUCLEIC ACID (DNA OR RNA); SEVERE ACUTE RESPIRATORY SYNDROME CORONAVIRUS 2 (SARS-COV-2) (CORONAVIRUS DISEASE [COVID-19]), AMPLIFIED PROBE TECHNIQUE, MAKING USE OF HIGH THROUGHPUT TECHNOLOGIES AS DESCRIBED BY CMS-2020-01-R: HCPCS

## 2021-12-13 PROCEDURE — C9803 HOPD COVID-19 SPEC COLLECT: HCPCS

## 2021-12-14 NOTE — H&P
Colorectal Surgery History and Physical    Diagnosis: Adenocarcinoma of sigmoid colon (CMS/HCC) [C18.7].    HPI     Patient is a 51 y.o. female who presents for surveillance colonoscopy. Has history of T3N2 sigmoid colon ca s/p Tequila's in , reversal with descending colorectal anastomosis in  with adjuvant FOLFOX. Last C/A/P CT Aug 2021 shows MARI. Last CEA 0.8 in Aug 2021.   Last colonoscopy in 2019 was normal, DD4.     Medical History:   Past Medical History:   Diagnosis Date   • Cancer of sigmoid (CMS/HCC)     T3N2   • Type 2 diabetes mellitus (CMS/HCC) 2021       Surgical History:   Past Surgical History:   Procedure Laterality Date   •  SECTION     • COLON SURGERY  05/15/2013    Colonoscopy to the base of the cecum. Limited ileoscopy, biopsy, flat area of the base of the cecum.    • COLON SURGERY  2015    Colonoscopy to the base of the cecum, normal anastomosis.   • COLON SURGERY  2011    Colonoscopy to the base of the cecum   • COLON SURGERY  07/15/2009    Colonoscopy to the base of the cecum   • COLON SURGERY  2008    Colonoscopy to the base of the cecum   • COLON SURGERY  2008    Exploratory laparoscopy with laparoscopic Tequila's resection, takedown of the splenic flexure, resection of residual sigmoid colon,portion of rectum with descending colorectal anastomosis.    • COLON SURGERY  2007    Total colonoscopy via stoma and flexible sigmoidoscopy per rectum with cold forceps biopsy.    • COLON SURGERY  2017    Colonoscopy to the base of the cecum       Social History:   Social History     Socioeconomic History   • Marital status:      Spouse name: Not on file   • Number of children: Not on file   • Years of education: Not on file   • Highest education level: Not on file   Occupational History   • Not on file   Tobacco Use   • Smoking status: Never Smoker   • Smokeless tobacco: Never Used   Substance and Sexual Activity   • Alcohol use: Not on  file   • Drug use: Not on file   • Sexual activity: Not on file   Other Topics Concern   • Not on file   Social History Narrative   • Not on file     Social Determinants of Health     Financial Resource Strain: Not on file   Food Insecurity: Not on file   Transportation Needs: Not on file   Physical Activity: Not on file   Stress: Not on file   Social Connections: Not on file   Intimate Partner Violence: Not on file   Housing Stability: Not on file       Family History:   Family History   Problem Relation Age of Onset   • Colon cancer Paternal Grandfather        Allergies: Adhesive tape-silicones, Iodinated contrast media, Penicillins, and Sulfa (sulfonamide antibiotics)    Home Medications:      Current Medications:  •  atorvastatin  •  diphenhydrAMINE  •  escitalopram  •  levothyroxine  •  naproxen-esomeprazole  •  predniSONE    Review of Systems  All other systems reviewed and negative except as noted in the HPI.    Objective     Vital Signs for the last 24 hours:  BP: ()/()   Arterial Line BP: ()/()     Physicial Exam    General appearance: alert, appears stated age and cooperative  Head: normocephalic, without obvious abnormality, atraumatic  Eyes: conjunctivae/corneas clear. PERRL, EOM's intact. Fundi benign.  Ears: normal TM's and external ear canals both ears  Nose: Nares normal. Septum midline. Mucosa normal. No drainage or sinus tenderness.  Throat: lips, mucosa, and tongue normal; teeth and gums normal  Neck: no adenopathy, no carotid bruit, no JVD, supple, symmetrical, trachea midline and thyroid not enlarged, symmetric, no tenderness/mass/nodules  Back: symmetric, no curvature. ROM normal. No CVA tenderness.  Lungs: clear to auscultation bilaterally  Chest wall: no tenderness  Heart: regular rate and rhythm, S1, S2 normal, no murmur, click, rub or gallop  Abdomen: soft, non-tender; bowel sounds normal; no masses, no organomegaly  Genitalia: deferred  Rectal: deferred  Extremities: extremities normal,  warm and well-perfused; no cyanosis, clubbing, or edema  Pulses: 2+ and symmetric  Skin: Skin color, texture, turgor normal. No rashes or lesions  Lymph nodes: Cervical, supraclavicular, and axillary nodes normal.  Neurologic: Grossly normal      Labs  No new labs.    Imaging  Not applicable    VTE Assessment: Caprini      Assessment/Plan   51 y.o. female who presents for surveillance colonoscopy. Has history of T3N2 sigmoid colon ca s/p Tequila's in 2007, reversal with descending colorectal anastomosis in 2008 with adjuvant FOLFOX. Last C/A/P CT Aug 2021 shows MARI. Last CEA 0.8 in Aug 2021.   Last colonoscopy in 2019 was normal, DD4.       Code Status: No Order

## 2021-12-15 ENCOUNTER — ANESTHESIA EVENT (OUTPATIENT)
Dept: ENDOSCOPY | Facility: HOSPITAL | Age: 51
End: 2021-12-15
Payer: COMMERCIAL

## 2021-12-15 ENCOUNTER — ANESTHESIA (OUTPATIENT)
Dept: ENDOSCOPY | Facility: HOSPITAL | Age: 51
End: 2021-12-15
Payer: COMMERCIAL

## 2021-12-15 ENCOUNTER — HOSPITAL ENCOUNTER (OUTPATIENT)
Facility: HOSPITAL | Age: 51
Discharge: HOME | End: 2021-12-15
Attending: COLON & RECTAL SURGERY | Admitting: COLON & RECTAL SURGERY
Payer: COMMERCIAL

## 2021-12-15 VITALS
SYSTOLIC BLOOD PRESSURE: 123 MMHG | OXYGEN SATURATION: 100 % | WEIGHT: 212 LBS | HEIGHT: 68 IN | DIASTOLIC BLOOD PRESSURE: 69 MMHG | HEART RATE: 79 BPM | TEMPERATURE: 98 F | RESPIRATION RATE: 16 BRPM | BODY MASS INDEX: 32.13 KG/M2

## 2021-12-15 PROCEDURE — 37000002 HC ANESTHESIA MAC: Performed by: COLON & RECTAL SURGERY

## 2021-12-15 PROCEDURE — 45378 DIAGNOSTIC COLONOSCOPY: CPT | Performed by: COLON & RECTAL SURGERY

## 2021-12-15 PROCEDURE — 25800000 HC PHARMACY IV SOLUTIONS: Performed by: NURSE ANESTHETIST, CERTIFIED REGISTERED

## 2021-12-15 PROCEDURE — 25000000 HC PHARMACY GENERAL: Performed by: NURSE ANESTHETIST, CERTIFIED REGISTERED

## 2021-12-15 PROCEDURE — 99024 POSTOP FOLLOW-UP VISIT: CPT | Performed by: COLON & RECTAL SURGERY

## 2021-12-15 PROCEDURE — 75000014 HC COLONSCOPY DIAGNOSTIC: Performed by: COLON & RECTAL SURGERY

## 2021-12-15 PROCEDURE — 71000011 HC PACU PHASE 1 EA ADDL MIN: Performed by: COLON & RECTAL SURGERY

## 2021-12-15 PROCEDURE — 0DJD8ZZ INSPECTION OF LOWER INTESTINAL TRACT, VIA NATURAL OR ARTIFICIAL OPENING ENDOSCOPIC: ICD-10-PCS | Performed by: COLON & RECTAL SURGERY

## 2021-12-15 PROCEDURE — 71000001 HC PACU PHASE 1 INITIAL 30MIN: Performed by: COLON & RECTAL SURGERY

## 2021-12-15 PROCEDURE — 63600000 HC DRUGS/DETAIL CODE: Performed by: NURSE ANESTHETIST, CERTIFIED REGISTERED

## 2021-12-15 RX ORDER — ONDANSETRON HYDROCHLORIDE 2 MG/ML
4 INJECTION, SOLUTION INTRAVENOUS
Status: DISCONTINUED | OUTPATIENT
Start: 2021-12-15 | End: 2021-12-15 | Stop reason: HOSPADM

## 2021-12-15 RX ORDER — IBUPROFEN 200 MG
16-32 TABLET ORAL AS NEEDED
Status: DISCONTINUED | OUTPATIENT
Start: 2021-12-15 | End: 2021-12-15 | Stop reason: HOSPADM

## 2021-12-15 RX ORDER — LIDOCAINE HYDROCHLORIDE 10 MG/ML
INJECTION, SOLUTION EPIDURAL; INFILTRATION; INTRACAUDAL; PERINEURAL AS NEEDED
Status: DISCONTINUED | OUTPATIENT
Start: 2021-12-15 | End: 2021-12-15 | Stop reason: SURG

## 2021-12-15 RX ORDER — DIPHENHYDRAMINE HCL 50 MG/ML
12.5 VIAL (ML) INJECTION
Status: DISCONTINUED | OUTPATIENT
Start: 2021-12-15 | End: 2021-12-15 | Stop reason: HOSPADM

## 2021-12-15 RX ORDER — SODIUM CHLORIDE 9 MG/ML
INJECTION, SOLUTION INTRAVENOUS CONTINUOUS PRN
Status: DISCONTINUED | OUTPATIENT
Start: 2021-12-15 | End: 2021-12-15 | Stop reason: SURG

## 2021-12-15 RX ORDER — DEXTROSE 40 %
15-30 GEL (GRAM) ORAL AS NEEDED
Status: DISCONTINUED | OUTPATIENT
Start: 2021-12-15 | End: 2021-12-15 | Stop reason: HOSPADM

## 2021-12-15 RX ORDER — DEXTROSE 50 % IN WATER (D50W) INTRAVENOUS SYRINGE
25 AS NEEDED
Status: DISCONTINUED | OUTPATIENT
Start: 2021-12-15 | End: 2021-12-15 | Stop reason: HOSPADM

## 2021-12-15 RX ORDER — PROPOFOL 10 MG/ML
INJECTION, EMULSION INTRAVENOUS CONTINUOUS PRN
Status: DISCONTINUED | OUTPATIENT
Start: 2021-12-15 | End: 2021-12-15 | Stop reason: SURG

## 2021-12-15 RX ADMIN — LIDOCAINE HYDROCHLORIDE 5 ML: 10 INJECTION, SOLUTION EPIDURAL; INFILTRATION; INTRACAUDAL; PERINEURAL at 14:55

## 2021-12-15 RX ADMIN — PROPOFOL INJECTABLE EMULSION 200 MCG/KG/MIN: 10 INJECTION, EMULSION INTRAVENOUS at 14:55

## 2021-12-15 RX ADMIN — SODIUM CHLORIDE: 9 INJECTION, SOLUTION INTRAVENOUS at 14:47

## 2021-12-15 NOTE — POST-OP (NON-BILLABLE)
Preoperative diagnosis: T3N2 adenocarcinoma of the sigmoid colon status post Darling's with laparoscopic reversal in 2008 adjuvant FOLFOX    Postoperative diagnosis: Normal colonoscopy    Surgeon: Brown Almanza MD    Procedure: Colonoscopy based cecum    DD: 5.0    Findings: Normal colon repeat colonoscopy in 2 years time    Followup: Repeat colonoscopy in 2 years time

## 2021-12-15 NOTE — ANESTHESIA PREPROCEDURE EVALUATION
Anesthesia ROS/MED HX    Anesthesia History - neg  Pulmonary - neg  Neuro/Psych - neg  Cardiovascular- neg  Hematological - neg  GI/Hepatic- neg  Musculoskeletal- neg  Renal Disease- neg  Endo/Other   Diabetes  History of cancer and colon cancer  Body Habitus: Overweight      Past Surgical History:   Procedure Laterality Date   •  SECTION     • COLON SURGERY  05/15/2013    Colonoscopy to the base of the cecum. Limited ileoscopy, biopsy, flat area of the base of the cecum.    • COLON SURGERY  2015    Colonoscopy to the base of the cecum, normal anastomosis.   • COLON SURGERY  2011    Colonoscopy to the base of the cecum   • COLON SURGERY  07/15/2009    Colonoscopy to the base of the cecum   • COLON SURGERY  2008    Colonoscopy to the base of the cecum   • COLON SURGERY  2008    Exploratory laparoscopy with laparoscopic Tequila's resection, takedown of the splenic flexure, resection of residual sigmoid colon,portion of rectum with descending colorectal anastomosis.    • COLON SURGERY  2007    Total colonoscopy via stoma and flexible sigmoidoscopy per rectum with cold forceps biopsy.    • COLON SURGERY  2017    Colonoscopy to the base of the cecum       Physical Exam    Airway   Mallampati: III   TM distance: >3 FB   Neck ROM: full  Cardiovascular - normal   Rhythm: regular   Rate: normalPulmonary - normal   clear to auscultation        Anesthesia Plan    Plan: MAC     Airway: natural airway / supplemental oxygen   ASA 3  Anesthetic plan and risks discussed with: patient  Induction:    intravenous   Postop Plan:   Patient Disposition: phase II then home

## 2021-12-15 NOTE — OP NOTE
_______________________________________________________________________________  Patient Name: Mercy Vizcarra             Procedure Date: 12/15/2021 2:45 PM  MRN: [id number]                     Account Number: [id number]  YOB: 1970              Age: 51  Gender: Female                        Note Status: Finalized  Attending MD: MAAME SILVERIO MD  _______________________________________________________________________________  Procedure:             Colonoscopy  Indications:           Personal history of malignant neoplasm of the colon  Providers:             MAAME SILVERIO MD (Doctor)  Referring MD:          CHELSEY HAAS MD  Requesting Provider:  Medicines:             Monitored Anesthesia Care  Complications:         No immediate complications.  _______________________________________________________________________________  Procedure:             After I obtained informed consent, the scope was  passed under direct vision. Throughout the procedure,  the patient's blood pressure, pulse, and oxygen  saturations were monitored continuously. The  colonoscope was introduced through the anus and  advanced to the cecum, identified by appendiceal  orifice and ileocecal valve. The colonoscopy was  performed without difficulty. The patient tolerated  the procedure well. The quality of the bowel  preparation was good.  Findings:  Anastomosis Normal without evidence of recurrence  The exam was otherwise without abnormality.  Impression:            - The examination was otherwise normal.  - No specimens collected.  Recommendation:        - Discharge patient to home.  Procedure Code(s):     --- Professional ---  37102, Colonoscopy, flexible; diagnostic, including  collection of specimen(s) by brushing or washing, when  performed (separate procedure)  Diagnosis Code(s):     --- Professional ---  Z85.038, Personal history of other malignant neoplasm  of large intestine  CPT copyright 2020 American Medical  Association. All rights reserved.  The codes documented in this report are preliminary and upon  review may  be revised to meet current compliance requirements.  Attending Participation:  I personally performed the entire procedure.  Maame Silverio MD  ________________  MAAME SILVERIO MD  12/15/2021 3:05:43 PM  This report has been signed electronically.  Number of Addenda: 0  Note Initiated On: 12/15/2021 2:45 PM

## 2021-12-15 NOTE — ANESTHESIA POSTPROCEDURE EVALUATION
Patient: Mercy Vizcarra    Procedure Summary     Date: 12/15/21 Room / Location: [facility] (D[facility] / [facility]    Anesthesia Start: 1447 Anesthesia Stop: 1515    Procedure: COLONOSCOPY (N/A Anus) Diagnosis:       Adenocarcinoma of sigmoid colon (CMS/HCC)      (COLONOSCOPY)    Providers: Brown Almanza MD Responsible Provider: Tayla Fields MD    Anesthesia Type: MAC ASA Status: 3          Anesthesia Type: MAC  PACU Vitals  12/15/2021 1508 - 12/15/2021 1608      12/15/2021  1512 12/15/2021  1530 12/15/2021  1544       BP: 105/60 111/66 123/69     Temp: 36.7 °C (98 °F) -- --     Pulse: 76 72 79     Resp: 16 20 16     SpO2: 99 % -- 100 %             Anesthesia Post Evaluation    Pain management: adequate  Patient participation: complete - patient participated  Level of consciousness: awake and alert  Cardiovascular status: acceptable  Airway Patency: adequate  Respiratory status: acceptable  Hydration status: acceptable  Anesthetic complications: no

## 2021-12-21 ENCOUNTER — HOSPITAL ENCOUNTER (OUTPATIENT)
Dept: RADIOLOGY | Facility: HOSPITAL | Age: 51
Discharge: HOME | End: 2021-12-21
Attending: ADVANCED PRACTICE MIDWIFE
Payer: COMMERCIAL

## 2021-12-21 ENCOUNTER — TRANSCRIBE ORDERS (OUTPATIENT)
Dept: REGISTRATION | Facility: HOSPITAL | Age: 51
End: 2021-12-21

## 2021-12-21 DIAGNOSIS — Z12.31 ENCOUNTER FOR SCREENING MAMMOGRAM FOR MALIGNANT NEOPLASM OF BREAST: ICD-10-CM

## 2021-12-21 DIAGNOSIS — Z12.31 ENCOUNTER FOR SCREENING MAMMOGRAM FOR MALIGNANT NEOPLASM OF BREAST: Primary | ICD-10-CM

## 2021-12-21 PROCEDURE — 77063 BREAST TOMOSYNTHESIS BI: CPT

## 2023-07-03 NOTE — ANESTHESIOLOGIST PRE-PROCEDURE ATTESTATION
Pre-Procedure Patient Identification:  I am the Primary Anesthesiologist and have identified the patient on 12/15/21 at 1:47 PM.   I have confirmed the procedure(s) will be performed by the following surgeon/proceduralist Brown Almanza MD.   Cyclophosphamide Pregnancy And Lactation Text: This medication is Pregnancy Category D and it isn't considered safe during pregnancy. This medication is excreted in breast milk.

## 2023-11-14 ENCOUNTER — HOSPITAL ENCOUNTER (OUTPATIENT)
Dept: RADIOLOGY | Facility: HOSPITAL | Age: 53
Discharge: HOME | End: 2023-11-14
Attending: ADVANCED PRACTICE MIDWIFE
Payer: COMMERCIAL

## 2023-11-14 ENCOUNTER — TRANSCRIBE ORDERS (OUTPATIENT)
Dept: RADIOLOGY | Facility: HOSPITAL | Age: 53
End: 2023-11-14

## 2023-11-14 DIAGNOSIS — Z12.31 ENCOUNTER FOR SCREENING MAMMOGRAM FOR MALIGNANT NEOPLASM OF BREAST: ICD-10-CM

## 2023-11-14 DIAGNOSIS — Z12.31 ENCOUNTER FOR SCREENING MAMMOGRAM FOR MALIGNANT NEOPLASM OF BREAST: Primary | ICD-10-CM

## 2023-11-14 PROCEDURE — 77067 SCR MAMMO BI INCL CAD: CPT

## 2024-05-21 NOTE — H&P
General Surgery History and Physical        HPI     Patient is a 53 y.o. female wHas history of T3N2 sigmoid colon ca s/p Tequila's in , reversal with descending colorectal anastomosis in  with adjuvant FOLFOX. Last C/A/P CT Aug 2021 shows MARI. Last CEA 0.8 in Aug 2021. Last colonoscopy 2021 normal, DD 5.       Medical History:   Past Medical History:   Diagnosis Date    Cancer of sigmoid (CMS/HCC)     T3N2    Type 2 diabetes mellitus (CMS/HCC) 2021       Surgical History:   Past Surgical History:   Procedure Laterality Date     SECTION      COLON SURGERY  05/15/2013    Colonoscopy to the base of the cecum. Limited ileoscopy, biopsy, flat area of the base of the cecum.     COLON SURGERY  2015    Colonoscopy to the base of the cecum, normal anastomosis.    COLON SURGERY  2011    Colonoscopy to the base of the cecum    COLON SURGERY  07/15/2009    Colonoscopy to the base of the cecum    COLON SURGERY  2008    Colonoscopy to the base of the cecum    COLON SURGERY  2008    Exploratory laparoscopy with laparoscopic Tequila's resection, takedown of the splenic flexure, resection of residual sigmoid colon,portion of rectum with descending colorectal anastomosis.     COLON SURGERY  2007    Total colonoscopy via stoma and flexible sigmoidoscopy per rectum with cold forceps biopsy.     COLON SURGERY  2017    Colonoscopy to the base of the cecum       Social History:   Social History     Socioeconomic History    Marital status:    Tobacco Use    Smoking status: Never    Smokeless tobacco: Never       Family History:   Family History   Problem Relation Age of Onset    Colon cancer Paternal Grandfather        Allergies: Adhesive tape-silicones, Iodinated contrast media, Penicillins, and Sulfa (sulfonamide antibiotics)    Home Medications:      Current Medications:    atorvastatin    diphenhydrAMINE    escitalopram    levothyroxine    naproxen-esomeprazole     predniSONE    Review of Systems  Pertinent items are noted in HPI.    Objective     Vital Signs for the last 24 hours:  BP: ()/()   Arterial Line BP: ()/()     Physicial Exam    General appearance: alert, appears stated age, and cooperative  Head: normocephalic, without obvious abnormality, atraumatic  Eyes: conjunctivae/corneas clear. PERRL, EOM's intact. Fundi benign.  Ears: normal TM's and external ear canals both ears  Nose: Nares normal. Septum midline. Mucosa normal. No drainage or sinus tenderness.  Throat: lips, mucosa, and tongue normal; teeth and gums normal  Neck: no adenopathy, no carotid bruit, no JVD, supple, symmetrical, trachea midline, and thyroid not enlarged, symmetric, no tenderness/mass/nodules  Back: symmetric, no curvature. ROM normal. No CVA tenderness.  Lungs: clear to auscultation bilaterally  Chest wall: no tenderness  Heart: regular rate and rhythm, S1, S2 normal, no murmur, click, rub or gallop  Abdomen: soft, non-tender; bowel sounds normal; no masses, no organomegaly    Extremities: extremities normal, warm and well-perfused; no cyanosis, clubbing, or edema  Pulses: 2+ and symmetric  Skin: Skin color, texture, turgor normal. No rashes or lesions  Lymph nodes: Cervical, supraclavicular, and axillary nodes normal.  Neurologic: Grossly normal        Assessment/Plan colonoscopy    Code Status: No Order

## 2024-05-22 ENCOUNTER — ANESTHESIA (OUTPATIENT)
Dept: ENDOSCOPY | Facility: HOSPITAL | Age: 54
End: 2024-05-22
Payer: COMMERCIAL

## 2024-05-22 ENCOUNTER — ANESTHESIA EVENT (OUTPATIENT)
Dept: ENDOSCOPY | Facility: HOSPITAL | Age: 54
End: 2024-05-22
Payer: COMMERCIAL

## 2024-05-22 ENCOUNTER — HOSPITAL ENCOUNTER (OUTPATIENT)
Facility: HOSPITAL | Age: 54
Discharge: HOME | End: 2024-05-22
Attending: COLON & RECTAL SURGERY | Admitting: COLON & RECTAL SURGERY
Payer: COMMERCIAL

## 2024-05-22 VITALS
BODY MASS INDEX: 35.77 KG/M2 | OXYGEN SATURATION: 97 % | RESPIRATION RATE: 27 BRPM | TEMPERATURE: 97.2 F | HEART RATE: 80 BPM | WEIGHT: 236 LBS | HEIGHT: 68 IN | SYSTOLIC BLOOD PRESSURE: 98 MMHG | DIASTOLIC BLOOD PRESSURE: 66 MMHG

## 2024-05-22 DIAGNOSIS — Z12.11 ENCOUNTER FOR SCREENING FOR MALIGNANT NEOPLASM OF COLON: ICD-10-CM

## 2024-05-22 DIAGNOSIS — Z85.038 HISTORY OF MALIGNANT NEOPLASM OF LARGE INTESTINE: ICD-10-CM

## 2024-05-22 LAB
GLUCOSE BLD-MCNC: 104 MG/DL (ref 70–99)
POCT TEST: ABNORMAL

## 2024-05-22 PROCEDURE — 0DBH8ZX EXCISION OF CECUM, VIA NATURAL OR ARTIFICIAL OPENING ENDOSCOPIC, DIAGNOSTIC: ICD-10-PCS | Performed by: COLON & RECTAL SURGERY

## 2024-05-22 PROCEDURE — 71000011 HC PACU PHASE 1 EA ADDL MIN: Performed by: COLON & RECTAL SURGERY

## 2024-05-22 PROCEDURE — 37000002 HC ANESTHESIA MAC: Performed by: COLON & RECTAL SURGERY

## 2024-05-22 PROCEDURE — 45380 COLONOSCOPY AND BIOPSY: CPT | Performed by: COLON & RECTAL SURGERY

## 2024-05-22 PROCEDURE — 25800000 HC PHARMACY IV SOLUTIONS: Performed by: NURSE ANESTHETIST, CERTIFIED REGISTERED

## 2024-05-22 PROCEDURE — 71000001 HC PACU PHASE 1 INITIAL 30MIN: Performed by: COLON & RECTAL SURGERY

## 2024-05-22 PROCEDURE — 25000000 HC PHARMACY GENERAL: Performed by: NURSE ANESTHETIST, CERTIFIED REGISTERED

## 2024-05-22 PROCEDURE — 88305 TISSUE EXAM BY PATHOLOGIST: CPT | Performed by: COLON & RECTAL SURGERY

## 2024-05-22 PROCEDURE — 63600000 HC DRUGS/DETAIL CODE: Performed by: NURSE ANESTHETIST, CERTIFIED REGISTERED

## 2024-05-22 RX ORDER — SODIUM CHLORIDE 9 MG/ML
INJECTION, SOLUTION INTRAVENOUS CONTINUOUS PRN
Status: DISCONTINUED | OUTPATIENT
Start: 2024-05-22 | End: 2024-05-22 | Stop reason: SURG

## 2024-05-22 RX ORDER — LEVOTHYROXINE SODIUM 88 UG/1
88 TABLET ORAL DAILY
COMMUNITY
Start: 2024-03-11 | End: 2024-11-21 | Stop reason: SDUPTHER

## 2024-05-22 RX ORDER — LIDOCAINE HYDROCHLORIDE 10 MG/ML
INJECTION, SOLUTION EPIDURAL; INFILTRATION; INTRACAUDAL; PERINEURAL AS NEEDED
Status: DISCONTINUED | OUTPATIENT
Start: 2024-05-22 | End: 2024-05-22 | Stop reason: SURG

## 2024-05-22 RX ORDER — METFORMIN HYDROCHLORIDE 500 MG/1
TABLET ORAL
COMMUNITY
End: 2024-08-27 | Stop reason: SDUPTHER

## 2024-05-22 RX ORDER — PROPOFOL 200MG/20ML
SYRINGE (ML) INTRAVENOUS AS NEEDED
Status: DISCONTINUED | OUTPATIENT
Start: 2024-05-22 | End: 2024-05-22 | Stop reason: SURG

## 2024-05-22 RX ORDER — BUPROPION HYDROCHLORIDE 150 MG/1
150 TABLET ORAL DAILY
COMMUNITY

## 2024-05-22 RX ORDER — PROPOFOL 10 MG/ML
INJECTION, EMULSION INTRAVENOUS CONTINUOUS PRN
Status: DISCONTINUED | OUTPATIENT
Start: 2024-05-22 | End: 2024-05-22 | Stop reason: SURG

## 2024-05-22 RX ADMIN — PROPOFOL 150 MCG/KG/MIN: 10 INJECTION, EMULSION INTRAVENOUS at 14:38

## 2024-05-22 RX ADMIN — PROPOFOL 50 MG: 10 INJECTION, EMULSION INTRAVENOUS at 14:38

## 2024-05-22 RX ADMIN — PROPOFOL 50 MG: 10 INJECTION, EMULSION INTRAVENOUS at 14:39

## 2024-05-22 RX ADMIN — SODIUM CHLORIDE: 9 INJECTION, SOLUTION INTRAVENOUS at 14:36

## 2024-05-22 RX ADMIN — LIDOCAINE HYDROCHLORIDE 5 ML: 10 INJECTION, SOLUTION EPIDURAL; INFILTRATION; INTRACAUDAL; PERINEURAL at 14:38

## 2024-05-22 ASSESSMENT — PAIN SCALES - GENERAL: PAIN_LEVEL: 0

## 2024-05-22 NOTE — ANESTHESIA PREPROCEDURE EVALUATION
Relevant Problems   Other   (+) Cancer of sigmoid (CMS/HCC)       ROS/Med Hx     Past Surgical History:   Procedure Laterality Date     SECTION      COLON SURGERY  05/15/2013    Colonoscopy to the base of the cecum. Limited ileoscopy, biopsy, flat area of the base of the cecum.     COLON SURGERY  2015    Colonoscopy to the base of the cecum, normal anastomosis.    COLON SURGERY  2011    Colonoscopy to the base of the cecum    COLON SURGERY  07/15/2009    Colonoscopy to the base of the cecum    COLON SURGERY  2008    Colonoscopy to the base of the cecum    COLON SURGERY  2008    Exploratory laparoscopy with laparoscopic Tequila's resection, takedown of the splenic flexure, resection of residual sigmoid colon,portion of rectum with descending colorectal anastomosis.     COLON SURGERY  2007    Total colonoscopy via stoma and flexible sigmoidoscopy per rectum with cold forceps biopsy.     COLON SURGERY  2017    Colonoscopy to the base of the cecum       Physical Exam    Airway   Mallampati: II   TM distance: >3 FB   Neck ROM: full  Cardiovascular - normal   Rhythm: regular   Rate: normalPulmonary - normal   clear to auscultation  Dental - normal        Anesthesia Plan    Plan: MAC    Technique: MAC      Airway: natural airway / supplemental oxygen    3 ASA  Anesthetic plan and risks discussed with: patient  Induction:    intravenous   Postop Plan:   Patient Disposition: phase II then home   Pain Management: IV analgesics

## 2024-05-22 NOTE — ANESTHESIOLOGIST PRE-PROCEDURE ATTESTATION
Pre-Procedure Patient Identification:  I am the Primary Anesthesiologist and have identified the patient on 05/22/24 at 1:42 PM.   I have confirmed the procedure(s) will be performed by the following surgeon/proceduralist Brown Almanza MD.

## 2024-05-22 NOTE — ANESTHESIA POSTPROCEDURE EVALUATION
Patient: Mercy Vizcarra    Procedure Summary       Date: 05/22/24 Room / Location: [facility] (D[facility]    Anesthesia Start: 1436 Anesthesia Stop: 1502    Procedure: FLEXIBLE COLONOSCOPY PROXIMAL TO SPLENIC FLEXURE WITH BIOPSY (Anus) Diagnosis:       Encounter for screening for malignant neoplasm of colon      History of malignant neoplasm of large intestine      (COLONOSCOPY)    Providers: Brown Almanza MD Responsible Provider: Bella Bragg MD    Anesthesia Type: MAC ASA Status: 3            Anesthesia Type: MAC  PACU Vitals  5/22/2024 1457 - 5/22/2024 1557        5/22/2024  1502 5/22/2024  1515 5/22/2024  1530       BP: 96/58 102/63 98/66     Temp: 36.2 °C (97.2 °F) -- --     Pulse: 86 82 80     Resp: 18 20 27     SpO2: 96 % 97 % 97 %               Anesthesia Post Evaluation    Pain score: 0  Pain management: adequate  Mode of pain management: IV medication  Patient location during evaluation: PACU  Patient participation: complete - patient participated  Level of consciousness: awake and alert  Cardiovascular status: acceptable and hemodynamically stable  Airway Patency: adequate and patent  Respiratory status: acceptable and nasal cannula  Hydration status: acceptable  Anesthetic complications: no

## 2024-05-22 NOTE — POST-OP (NON-BILLABLE)
Preoperative diagnosis: T3N2 adenocarcinoma of the sigmoid colon status post Darling's with laparoscopic reversal in 2008 adjuvant FOLFOX     Postoperative diagnosis: Normal colonoscopy 1 cm sessile polyp of the cecum     Surgeon: Brown Almanza MD     Procedure: Colonoscopy based cecum     DD: 5.0     Findings: Normal colon repeat colonoscopy in 2 years time     Followup: Repeat colonoscopy in 2 years time

## 2024-05-22 NOTE — OP NOTE
_______________________________________________________________________________  Patient Name: Mercy Vizcarra             Procedure Date: 5/22/2024 2:30 PM  MRN: [id number]                     Account Number: [id number]  YOB: 1970              Age: 53  Gender: Female                        Note Status: Finalized  Attending MD: MAAME SILVERIO MD,  _______________________________________________________________________________  Procedure:             Colonoscopy  Indications:           Personal history of malignant neoplasm of the colon  Providers:             MAAME SILVERIO MD (Doctor)  Referring MD:          CHELSEY HAAS MD  Requesting Provider:  Medicines:             Monitored Anesthesia Care  Complications:         No immediate complications.  _______________________________________________________________________________  Procedure:             After I obtained informed consent, the scope was  passed under direct vision. Throughout the procedure,  the patient's blood pressure, pulse, and oxygen  saturations were monitored continuously. The  colonoscope was introduced through the anus and  advanced to the cecum, identified by the appendiceal  orifice. The colonoscopy was performed without  difficulty. The patient tolerated the procedure well.  The quality of the bowel preparation was good.  Findings:  Anastomosis Normal without evidence of recurrence  A 10 mm polyp was found in the cecum. The polyp was sessile. Biopsies  were taken with a cold forceps for histology.  Impression:            - No specimens collected.  Recommendation:        - Discharge patient to home.  - We are awaiting your pathology results. Please call  my office in five days at [phone].  Procedure Code(s):     --- Professional ---  49898, Colonoscopy, flexible; with biopsy, single or  multiple  Diagnosis Code(s):     --- Professional ---  Z85.038, Personal history of other malignant neoplasm  of large intestine  CPT  copyright 2021 American Medical Association. All rights reserved.  The codes documented in this report are preliminary and upon  review may  be revised to meet current compliance requirements.  Attending Participation:  I was present and participated during the entire procedure, including  non-nicole portions.  Maame Silverio MD  ________________  MAAME SILVERIO MD  5/22/2024 3:19:18 PM  This report has been signed electronically.  Number of Addenda: 0  Note Initiated On: 5/22/2024 2:30 PM

## 2024-05-23 LAB
CASE RPRT: NORMAL
CLINICAL INFO: NORMAL
PATH REPORT.FINAL DX SPEC: NORMAL
PATH REPORT.GROSS SPEC: NORMAL

## 2024-06-10 ENCOUNTER — TRANSCRIBE ORDERS (OUTPATIENT)
Dept: SCHEDULING | Age: 54
End: 2024-06-10

## 2024-06-10 DIAGNOSIS — R26.89 OTHER ABNORMALITIES OF GAIT AND MOBILITY: ICD-10-CM

## 2024-06-10 DIAGNOSIS — G44.52 NEW DAILY PERSISTENT HEADACHE (NDPH): Primary | ICD-10-CM

## 2024-06-28 ENCOUNTER — HOSPITAL ENCOUNTER (OUTPATIENT)
Dept: RADIOLOGY | Facility: HOSPITAL | Age: 54
Discharge: HOME | End: 2024-06-28
Attending: FAMILY MEDICINE
Payer: COMMERCIAL

## 2024-06-28 DIAGNOSIS — G44.52 NEW DAILY PERSISTENT HEADACHE (NDPH): ICD-10-CM

## 2024-06-28 DIAGNOSIS — R26.89 OTHER ABNORMALITIES OF GAIT AND MOBILITY: ICD-10-CM

## 2024-06-28 PROCEDURE — A9573: HCPCS | Performed by: FAMILY MEDICINE

## 2024-06-28 PROCEDURE — A9579 GAD-BASE MR CONTRAST NOS,1ML: HCPCS | Performed by: FAMILY MEDICINE

## 2024-06-28 PROCEDURE — 70553 MRI BRAIN STEM W/O & W/DYE: CPT

## 2024-06-28 RX ADMIN — GADOPICLENOL 10.5 ML: 485.1 INJECTION INTRAVENOUS at 08:55

## 2024-08-27 ENCOUNTER — TELEPHONE (OUTPATIENT)
Dept: PRIMARY CARE | Facility: CLINIC | Age: 54
End: 2024-08-27
Payer: COMMERCIAL

## 2024-08-27 RX ORDER — ATORVASTATIN CALCIUM 40 MG/1
40 TABLET, FILM COATED ORAL DAILY
Qty: 90 TABLET | Refills: 1 | Status: SHIPPED | OUTPATIENT
Start: 2024-08-27 | End: 2025-03-06

## 2024-08-27 RX ORDER — METFORMIN HYDROCHLORIDE 500 MG/1
500 TABLET ORAL 2 TIMES DAILY
Qty: 180 TABLET | Refills: 1 | Status: SHIPPED | OUTPATIENT
Start: 2024-08-27 | End: 2025-03-06

## 2024-08-27 NOTE — TELEPHONE ENCOUNTER
Patient called in needing refills    Metformin 500mg  Atorvastatin 40mg    Ph[facility] in [place].    I did ask if she will be establishing care with Dr. Rosado with [facility] in [place]. She said yes.

## 2024-11-21 RX ORDER — LEVOTHYROXINE SODIUM 88 UG/1
88 TABLET ORAL DAILY
Qty: 30 TABLET | Refills: 1 | Status: SHIPPED | OUTPATIENT
Start: 2024-11-21 | End: 2025-02-28

## 2024-11-21 NOTE — TELEPHONE ENCOUNTER
Pt LVM on refill line stating she needs a refill of her levothyroxine 88mcg   Pharm has been confirmed   Last call note stated pt will be establishing care here with     No appointments scheduled yet     Order is pending

## 2024-12-27 ENCOUNTER — TRANSCRIBE ORDERS (OUTPATIENT)
Dept: SCHEDULING | Age: 54
End: 2024-12-27

## 2024-12-27 DIAGNOSIS — Z12.31 ENCOUNTER FOR SCREENING MAMMOGRAM FOR MALIGNANT NEOPLASM OF BREAST: Primary | ICD-10-CM

## 2025-01-09 ENCOUNTER — NURSE TRIAGE (OUTPATIENT)
Dept: PRIMARY CARE | Facility: CLINIC | Age: 55
End: 2025-01-09
Payer: COMMERCIAL

## 2025-01-09 NOTE — TELEPHONE ENCOUNTER
"Patient transferred to the Primary Care Telephonic Nurse Triage Line with complaints of abdominal pain     HPI:  Pt triaged for sudden onsent, constant abdominal pain > 2 hours since 430 this am. Pt has history of kidney stones but not feeling urinary symmptoms today. Pt denies fever.BM regular and yesterday. Pt rates pain at 5-6/10. Dispo is ED now. Pt says she will go [facility]. PCP and clinical team informed.     Disposition:  Go to ED Now (or to Office With PCP Approval)    Care Advice:  Care Advice Given    Patient/Caregiver understands and will follow care advice?: Yes, plans to follow advice      Given By Given At Modified    Alice Cortez 1/9/2025  1:45 PM Yes           GO TO ED now    Alice Cortez 1/9/2025  1:45 PM No           DRINK CLEAR FLUIDS:   * Drink clear fluids only (such as water, flat soft drinks or half-strength Gatorade).  * Sip small amounts at a time, until you feel better and the pain is gone.  * Then slowly return to a regular diet.    Alice Cortez 1/9/2025  1:45 PM No           CALL BACK IF:  * Severe pain lasts over 1 hour  * Constant pain lasts over 2 hours  * Intermittent pains (comes and goes, cramps) lasts over 48 hours  * You are pregnant  * You become worse           Reason for Disposition   MILD TO MODERATE constant pain lasting > 2 hours    Answer Assessment - Initial Assessment Questions  1. LOCATION: \"Where does it hurt?\"       left center  2. RADIATION: \"Does the pain shoot anywhere else?\" (e.g., chest, back)      left center back   3. ONSET: \"When did the pain begin?\" (e.g., minutes, hours or days ago)       430 am   4. SUDDEN: \"Gradual or sudden onset?\"      sudden   5. PATTERN \"Does the pain come and go, or is it constant?\"      awoke 430  6. SEVERITY: \"How bad is the pain?\"  (e.g., Scale 1-10; mild, moderate, or severe)      5/10  7. RECURRENT SYMPTOM: \"Have you ever had this type of stomach pain before?\" If Yes, ask: \"When was the last time?\" and \"What " "happened that time?\"       \"weird tingling down left arm\" x 3-4 wks   8. CAUSE: \"What do you think is causing the stomach pain?\"        9. RELIEVING/AGGRAVATING FACTORS: \"What makes it better or worse?\" (e.g., antacids, bending or twisting motion, bowel movement)        10. OTHER SYMPTOMS: \"Do you have any other symptoms?\" (e.g., back pain, diarrhea, fever, urination pain, vomiting)        very very slight nausea, wet burp  11. PREGNANCY: \"Is there any chance you are pregnant?\" \"When was your last menstrual period?\"        no into groin  no relief or worse with urine  +reflux. constip occas  last BM yest   pushiing on area, not making worse. \"Constant\"    Protocols used: Abdominal Pain - Female-Adult-[place]    "

## 2025-01-09 NOTE — TELEPHONE ENCOUNTER
Regarding: Severe Abdominal Pain 5 out of 10 and getting worse  ----- Message from Zechariah BASILIO sent at 1/9/2025  1:37 PM EST -----  Patient called today with red flag complaint of Severe Abdominal Pain 5 out of 10 and getting worse  .  Call transferred to Primary Care Nurse Triage Line.

## 2025-01-16 ENCOUNTER — NURSE TRIAGE (OUTPATIENT)
Dept: PRIMARY CARE | Facility: CLINIC | Age: 55
End: 2025-01-16
Payer: COMMERCIAL

## 2025-01-16 ENCOUNTER — HOSPITAL ENCOUNTER (EMERGENCY)
Facility: HOSPITAL | Age: 55
Discharge: HOME | End: 2025-01-16
Attending: STUDENT IN AN ORGANIZED HEALTH CARE EDUCATION/TRAINING PROGRAM | Admitting: STUDENT IN AN ORGANIZED HEALTH CARE EDUCATION/TRAINING PROGRAM
Payer: COMMERCIAL

## 2025-01-16 ENCOUNTER — APPOINTMENT (EMERGENCY)
Dept: RADIOLOGY | Facility: HOSPITAL | Age: 55
End: 2025-01-16
Payer: COMMERCIAL

## 2025-01-16 VITALS
HEART RATE: 89 BPM | HEIGHT: 67 IN | TEMPERATURE: 99.3 F | SYSTOLIC BLOOD PRESSURE: 138 MMHG | BODY MASS INDEX: 37.04 KG/M2 | OXYGEN SATURATION: 96 % | WEIGHT: 236 LBS | DIASTOLIC BLOOD PRESSURE: 77 MMHG | RESPIRATION RATE: 18 BRPM

## 2025-01-16 DIAGNOSIS — N13.2 HYDRONEPHROSIS WITH URINARY OBSTRUCTION DUE TO URETERAL CALCULUS: ICD-10-CM

## 2025-01-16 DIAGNOSIS — N20.1 CALCULUS OF URETER: Primary | ICD-10-CM

## 2025-01-16 LAB
ALBUMIN SERPL-MCNC: 4.6 G/DL (ref 3.5–5.7)
ALP SERPL-CCNC: 133 IU/L (ref 34–125)
ALT SERPL-CCNC: 30 IU/L (ref 7–52)
ANION GAP SERPL CALC-SCNC: 8 MEQ/L (ref 3–15)
AST SERPL-CCNC: 22 IU/L (ref 13–39)
BACTERIA URNS QL MICRO: ABNORMAL /HPF
BASOPHILS # BLD: 0.02 K/UL (ref 0.01–0.1)
BASOPHILS NFR BLD: 0.2 %
BILIRUB SERPL-MCNC: 0.7 MG/DL (ref 0.3–1.2)
BILIRUB UR QL STRIP.AUTO: NEGATIVE MG/DL
BUN SERPL-MCNC: 23 MG/DL (ref 7–25)
CALCIUM SERPL-MCNC: 9.5 MG/DL (ref 8.6–10.3)
CHLORIDE SERPL-SCNC: 103 MEQ/L (ref 98–107)
CLARITY UR REFRACT.AUTO: ABNORMAL
CO2 SERPL-SCNC: 25 MEQ/L (ref 21–31)
COLOR UR AUTO: YELLOW
CREAT SERPL-MCNC: 1.5 MG/DL (ref 0.6–1.2)
DIFFERENTIAL METHOD BLD: ABNORMAL
EGFRCR SERPLBLD CKD-EPI 2021: 40.7 ML/MIN/1.73M*2
EOSINOPHIL # BLD: 0.12 K/UL (ref 0.04–0.36)
EOSINOPHIL NFR BLD: 1.1 %
ERYTHROCYTE [DISTWIDTH] IN BLOOD BY AUTOMATED COUNT: 12.4 % (ref 11.7–14.4)
GLUCOSE SERPL-MCNC: 133 MG/DL (ref 70–99)
GLUCOSE UR STRIP.AUTO-MCNC: NEGATIVE MG/DL
HCT VFR BLD AUTO: 43.4 % (ref 35–45)
HGB BLD-MCNC: 14.9 G/DL (ref 11.8–15.7)
HGB UR QL STRIP.AUTO: 3
HYALINE CASTS #/AREA URNS LPF: ABNORMAL /LPF
IMM GRANULOCYTES # BLD AUTO: 0.03 K/UL (ref 0–0.08)
IMM GRANULOCYTES NFR BLD AUTO: 0.3 %
KETONES UR STRIP.AUTO-MCNC: NEGATIVE MG/DL
LEUKOCYTE ESTERASE UR QL STRIP.AUTO: 1
LYMPHOCYTES # BLD: 2.32 K/UL (ref 1.2–3.5)
LYMPHOCYTES NFR BLD: 21.8 %
MCH RBC QN AUTO: 30.9 PG (ref 28–33.2)
MCHC RBC AUTO-ENTMCNC: 34.3 G/DL (ref 32.2–35.5)
MCV RBC AUTO: 90 FL (ref 83–98)
MONOCYTES # BLD: 0.98 K/UL (ref 0.28–0.8)
MONOCYTES NFR BLD: 9.2 %
MUCOUS THREADS URNS QL MICRO: ABNORMAL /LPF
NEUTROPHILS # BLD: 7.18 K/UL (ref 1.7–7)
NEUTS SEG NFR BLD: 67.4 %
NITRITE UR QL STRIP.AUTO: NEGATIVE
NRBC BLD-RTO: 0 %
PH UR STRIP.AUTO: 6 [PH]
PLATELET # BLD AUTO: 344 K/UL (ref 150–369)
PMV BLD AUTO: 9.9 FL (ref 9.4–12.3)
POTASSIUM SERPL-SCNC: 4.4 MEQ/L (ref 3.5–5.1)
PROT SERPL-MCNC: 7.8 G/DL (ref 6–8.2)
PROT UR QL STRIP.AUTO: 2
RBC # BLD AUTO: 4.82 M/UL (ref 3.93–5.22)
RBC #/AREA URNS HPF: ABNORMAL /HPF
SODIUM SERPL-SCNC: 136 MEQ/L (ref 136–145)
SP GR UR REFRACT.AUTO: 1.03
SQUAMOUS URNS QL MICRO: 2 /HPF
TROPONIN I SERPL HS-MCNC: 4.2 PG/ML
UROBILINOGEN UR STRIP-ACNC: 0.2 EU/DL
WBC # BLD AUTO: 10.65 K/UL (ref 3.8–10.5)
WBC #/AREA URNS HPF: ABNORMAL /HPF

## 2025-01-16 PROCEDURE — 85025 COMPLETE CBC W/AUTO DIFF WBC: CPT | Performed by: STUDENT IN AN ORGANIZED HEALTH CARE EDUCATION/TRAINING PROGRAM

## 2025-01-16 PROCEDURE — 74177 CT ABD & PELVIS W/CONTRAST: CPT

## 2025-01-16 PROCEDURE — 99284 EMERGENCY DEPT VISIT MOD MDM: CPT | Mod: 25

## 2025-01-16 PROCEDURE — 63600105 HC IODINE BASED CONTRAST: Mod: JZ | Performed by: PHYSICIAN ASSISTANT

## 2025-01-16 PROCEDURE — 85025 COMPLETE CBC W/AUTO DIFF WBC: CPT

## 2025-01-16 PROCEDURE — 63700000 HC SELF-ADMINISTRABLE DRUG: Performed by: PHYSICIAN ASSISTANT

## 2025-01-16 PROCEDURE — 87086 URINE CULTURE/COLONY COUNT: CPT | Performed by: STUDENT IN AN ORGANIZED HEALTH CARE EDUCATION/TRAINING PROGRAM

## 2025-01-16 PROCEDURE — 3E0333Z INTRODUCTION OF ANTI-INFLAMMATORY INTO PERIPHERAL VEIN, PERCUTANEOUS APPROACH: ICD-10-PCS | Performed by: STUDENT IN AN ORGANIZED HEALTH CARE EDUCATION/TRAINING PROGRAM

## 2025-01-16 PROCEDURE — 84484 ASSAY OF TROPONIN QUANT: CPT | Performed by: STUDENT IN AN ORGANIZED HEALTH CARE EDUCATION/TRAINING PROGRAM

## 2025-01-16 PROCEDURE — 96374 THER/PROPH/DIAG INJ IV PUSH: CPT | Mod: 59

## 2025-01-16 PROCEDURE — 63600000 HC DRUGS/DETAIL CODE: Mod: JZ | Performed by: PHYSICIAN ASSISTANT

## 2025-01-16 PROCEDURE — 81001 URINALYSIS AUTO W/SCOPE: CPT | Performed by: STUDENT IN AN ORGANIZED HEALTH CARE EDUCATION/TRAINING PROGRAM

## 2025-01-16 PROCEDURE — 36415 COLL VENOUS BLD VENIPUNCTURE: CPT

## 2025-01-16 PROCEDURE — 93005 ELECTROCARDIOGRAM TRACING: CPT | Performed by: STUDENT IN AN ORGANIZED HEALTH CARE EDUCATION/TRAINING PROGRAM

## 2025-01-16 PROCEDURE — 80053 COMPREHEN METABOLIC PANEL: CPT | Performed by: STUDENT IN AN ORGANIZED HEALTH CARE EDUCATION/TRAINING PROGRAM

## 2025-01-16 PROCEDURE — 93005 ELECTROCARDIOGRAM TRACING: CPT

## 2025-01-16 RX ORDER — CEFUROXIME AXETIL 250 MG/1
500 TABLET ORAL ONCE
Status: COMPLETED | OUTPATIENT
Start: 2025-01-16 | End: 2025-01-16

## 2025-01-16 RX ORDER — IOPAMIDOL 755 MG/ML
100 INJECTION, SOLUTION INTRAVASCULAR
Status: COMPLETED | OUTPATIENT
Start: 2025-01-16 | End: 2025-01-16

## 2025-01-16 RX ORDER — KETOROLAC TROMETHAMINE 30 MG/ML
30 INJECTION, SOLUTION INTRAMUSCULAR; INTRAVENOUS ONCE
Status: COMPLETED | OUTPATIENT
Start: 2025-01-16 | End: 2025-01-16

## 2025-01-16 RX ORDER — CEFUROXIME AXETIL 500 MG/1
500 TABLET ORAL 2 TIMES DAILY
Qty: 14 TABLET | Refills: 0 | Status: SHIPPED | OUTPATIENT
Start: 2025-01-16 | End: 2025-01-23

## 2025-01-16 RX ORDER — TAMSULOSIN HYDROCHLORIDE 0.4 MG/1
0.4 CAPSULE ORAL NIGHTLY
Qty: 7 CAPSULE | Refills: 0 | Status: SHIPPED | OUTPATIENT
Start: 2025-01-16 | End: 2025-03-06

## 2025-01-16 RX ORDER — OXYCODONE AND ACETAMINOPHEN 5; 325 MG/1; MG/1
1 TABLET ORAL EVERY 6 HOURS PRN
Qty: 12 TABLET | Refills: 0 | Status: SHIPPED | OUTPATIENT
Start: 2025-01-16 | End: 2025-03-06

## 2025-01-16 RX ORDER — ONDANSETRON 4 MG/1
4 TABLET, ORALLY DISINTEGRATING ORAL EVERY 8 HOURS PRN
Qty: 30 TABLET | Refills: 0 | Status: SHIPPED | OUTPATIENT
Start: 2025-01-16 | End: 2025-03-06

## 2025-01-16 RX ORDER — TAMSULOSIN HYDROCHLORIDE 0.4 MG/1
0.4 CAPSULE ORAL ONCE
Status: COMPLETED | OUTPATIENT
Start: 2025-01-16 | End: 2025-01-16

## 2025-01-16 RX ADMIN — CEFUROXIME AXETIL 500 MG: 250 TABLET, FILM COATED ORAL at 19:17

## 2025-01-16 RX ADMIN — IOPAMIDOL 100 ML: 755 INJECTION, SOLUTION INTRAVENOUS at 17:47

## 2025-01-16 RX ADMIN — KETOROLAC TROMETHAMINE 30 MG: 30 INJECTION, SOLUTION INTRAMUSCULAR at 16:32

## 2025-01-16 RX ADMIN — TAMSULOSIN HYDROCHLORIDE 0.4 MG: 0.4 CAPSULE ORAL at 19:17

## 2025-01-16 NOTE — TELEPHONE ENCOUNTER
Regarding: Red Flag - abdominal pain  ----- Message from Nelly GARZA sent at 1/16/2025  1:20 PM EST -----  Patient called today with red flag complaint of abdominal pain along with lower back pain.  Pain scale varies from a 3-7.  Has been going on for about a week. Call transferred to Primary Care Nurse Triage Line.

## 2025-01-16 NOTE — TELEPHONE ENCOUNTER
Patient transferred to the Primary Care Telephonic Nurse Triage Line with complaints of abdominal pain    HPI:  Patient reports mild to moderate left side abdominal pain that radiates to back. Reports that pain is constant but varies in intensity.    Patient spoke with NT on 1/9/25 and was advised to go ED. PCP reviewed.      Patient didn't go to ED because she thought  symptoms could be gas related.    Pain reduce after belching but never resolved.    Reports pain has been persistent since which prompt her call today    RN advised ED per protocol    Informed that I agree with previous disposition and notes were reviewed by PCP.    Patient apprehensive about going to ED due to insurance    Wants to confirm with PCP that she should go to ED vs ordering outpatient testing     RN reached out to Office RN via secure chat    Sending encounter as high priority to care team  Disposition:  Go to ED Now (or to Office With PCP Approval)    Care Advice:  Care Advice Given    Patient/Caregiver understands and will follow care advice?: No, wishes to speak to PCP      Given By Given At Modified    Laurie Benjamin 1/16/2025  1:46 PM Yes           GO TO ED/UCC NOW (OR TO OFFICE WITH PCP APPROVAL):  * Go to nearest emergency Department or Urgent Care Center. Leave Now.  * A message has been sent to your care team as HIGH PRIORITY.    Laurie Benjamin 1/16/2025  1:46 PM No           CALL BACK IF:  * New symptoms develop  * You become worse               Reason for Disposition   Nursing judgment    Protocols used: No Protocol Available-Adult-[place]

## 2025-01-16 NOTE — ED ATTESTATION NOTE
Procedures  Physical Exam  Review of Systems  Medical Decision Making      1/16/20254:10 PM  I have not personally seen and examined the patient. I was available to discuss the care and medical decision making for this patient.      Vital Signs Review: Vital signs have been reviewed. The oxygen saturation is  SpO2: 95 % which is  normal.    I was physically present for the key/critical portions of the following procedures: None    This document was created using Dragon dictation software.  There might be some typographical errors due to this technology.    We are in a period of time with increased volumes and decreased capacity.      Nelson Krause MD  01/16/25 1939

## 2025-01-16 NOTE — TELEPHONE ENCOUNTER
Patient transferred to the Primary Care Telephonic Nurse Triage Line.    Patient called back to state she decided she will go to the ED today.

## 2025-01-16 NOTE — ED PROVIDER NOTES
HPI   HISTORY OF PRESENT ILLNESS     54-year-old female with past medical history of colon cancer status post colectomy, kidney stone, type 2 diabetes presents with abdominal pain.  Patient reports left lower quadrant pain for the past week.  States it started dull and then became extreme on the first day but then she was able to burp and it improved.  States she has had constant mild pain for the past week but has been having inflaming in intensity.  Today it was a lot worse and similar to the first day.  Radiates to the left lower back. Denies fever, lightness, headache, chest pain, shortness of breath, nausea, vomiting, diarrhea, urinary symptoms.          Patient History   PAST HISTORY     Reviewed from Nursing Triage:       Past Medical History:   Diagnosis Date    Cancer of sigmoid (CMS/[facility])     T3N2    Kidney stone     Type 2 diabetes mellitus (CMS/HCC) 2021       Past Surgical History   Procedure Laterality Date     section      Colon surgery  05/15/2013    Colonoscopy to the base of the cecum. Limited ileoscopy, biopsy, flat area of the base of the cecum.     Colon surgery  2015    Colonoscopy to the base of the cecum, normal anastomosis.    Colon surgery  2011    Colonoscopy to the base of the cecum    Colon surgery  07/15/2009    Colonoscopy to the base of the cecum    Colon surgery  2008    Colonoscopy to the base of the cecum    Colon surgery  2008    Exploratory laparoscopy with laparoscopic Tequila's resection, takedown of the splenic flexure, resection of residual sigmoid colon,portion of rectum with descending colorectal anastomosis.     Colon surgery  2007    Total colonoscopy via stoma and flexible sigmoidoscopy per rectum with cold forceps biopsy.     Colon surgery  2017    Colonoscopy to the base of the cecum    COLONOSCOPY N/A 12/15/2021    Performed by [place]za [place] [facility]    Colonoscopy N/A 2019    Performed by Brown Almanza MD at  [facility]    FLEXIBLE COLONOSCOPY PROXIMAL TO SPLENIC FLEXURE WITH BIOPSY N/A 5/22/2024    Performed by Brown Almanza MD at [facility]    Lithotripsy         Family History   Problem Relation Name Age of Onset    Colon cancer Paternal Grandfather         Social History     Tobacco Use    Smoking status: Never    Smokeless tobacco: Never   Substance Use Topics    Alcohol use: Never    Drug use: Never         Review of Systems   REVIEW OF SYSTEMS     Review of Systems      VITALS     ED Vitals      Date/Time Temp Pulse Resp BP SpO2 [facility]   01/16/25 1756 -- 89 18 138/77 96 % BAM   01/16/25 1537 37.4 °C (99.3 °F) 96 18 164/101 95 % SB          Pulse Ox %: 95 % (01/16/25 1743)  Pulse Ox Interpretation: Normal (01/16/25 1743)  Heart Rate: 83 (01/16/25 1743)  Rhythm Strip Interpretation: Normal Sinus Rhythm (01/16/25 1743)     Physical Exam   PHYSICAL EXAM     Physical Exam  Vitals and nursing note reviewed.   Constitutional:       General: She is not in acute distress.     Appearance: She is well-developed.   HENT:      Head: Atraumatic.   Eyes:      Conjunctiva/sclera: Conjunctivae normal.   Cardiovascular:      Rate and Rhythm: Normal rate.   Pulmonary:      Effort: Pulmonary effort is normal.      Breath sounds: Normal breath sounds.   Abdominal:      General: Abdomen is flat. Bowel sounds are normal.      Palpations: Abdomen is soft.      Tenderness: There is abdominal tenderness in the left lower quadrant. There is no right CVA tenderness or left CVA tenderness.   Musculoskeletal:         General: No deformity.   Skin:     General: Skin is warm and dry.   Neurological:      Mental Status: She is alert. Mental status is at baseline.   Psychiatric:         Behavior: Behavior normal.           PROCEDURES     Procedures     DATA     Results       Procedure Component Value Units Date/Time    UA w/ reflex culture (ED Only) [[id number]]  (Abnormal) Collected: 01/16/25 1755    Specimen: Urine, Clean Catch Updated: 01/16/25 3478     Narrative:      The following orders were created for panel order UA w/ reflex culture (ED Only).  Procedure                               Abnormality         Status                     ---------                               -----------         ------                     UA Reflex to Culture (Ma...[[id number]]  Abnormal            Final result               UA Microscopic[[id number]]               Abnormal            Final result                 Please view results for these tests on the individual orders.    UA Microscopic [[id number]]  (Abnormal) Collected: 01/16/25 1755    Specimen: Urine, Clean Catch Updated: 01/16/25 1833     RBC, Urine Too Numerous To Count /HPF      WBC, Urine 35 TO 50 /HPF      Squamous Epithelial +2 /hpf      Hyaline Cast 3 TO 9 /lpf      Bacteria, Urine Rare /HPF      Mucus Rare /LPF     UA Reflex to Culture (Macroscopic) [[id number]]  (Abnormal) Collected: 01/16/25 1755    Specimen: Urine, Clean Catch Updated: 01/16/25 1818     Color, Urine Yellow     Clarity, Urine Cloudy     Specific Gravity, Urine 1.028     pH, Urine 6.0     Leukocyte Esterase +1     Nitrite, Urine Negative     Protein, Urine +2     Glucose, Urine Negative mg/dL      Ketones, Urine Negative mg/dL      Urobilinogen, Urine 0.2 EU/dL      Bilirubin, Urine Negative mg/dL      Blood, Urine +3     Comment: The sensitivity of the occult blood test is equivalent to approximately 4 intact RBC/HPF.       HS Troponin (with 2 hour reflex) [[id number]]  (Normal) Collected: 01/16/25 1548    Specimen: Blood, Venous Updated: 01/16/25 1627     High Sens Troponin I 4.2 pg/mL     Comprehensive metabolic panel [[id number]]  (Abnormal) Collected: 01/16/25 1548    Specimen: Blood, Venous Updated: 01/16/25 1620     Sodium 136 mEQ/L      Potassium 4.4 mEQ/L      Comment: Results obtained on plasma. Plasma Potassium values may be up to 0.4 mEQ/L less than serum values. The differences may be greater for patients with high platelet or white cell  counts.        Chloride 103 mEQ/L      CO2 25 mEQ/L      BUN 23 mg/dL      Creatinine 1.5 mg/dL      Glucose 133 mg/dL      Calcium 9.5 mg/dL      AST (SGOT) 22 IU/L      ALT (SGPT) 30 IU/L      Alkaline Phosphatase 133 IU/L      Total Protein 7.8 g/dL      Comment: Test performed on plasma which typically contains approximately 0.4 g/dL more protein than serum.        Albumin 4.6 g/dL      Bilirubin, Total 0.7 mg/dL      eGFR 40.7 mL/min/1.73m*2      Comment: Calculation based on the Chronic Kidney Disease Epidemiology Collaboration (CKD-EPI) equation refit without adjustment for race.        Anion Gap 8 mEQ/L     CBC and differential [[id number]]  (Abnormal) Collected: 01/16/25 1548    Specimen: Blood, Venous Updated: 01/16/25 1608     WBC 10.65 K/uL      RBC 4.82 M/uL      Hemoglobin 14.9 g/dL      Hematocrit 43.4 %      MCV 90.0 fL      MCH 30.9 pg      MCHC 34.3 g/dL      RDW 12.4 %      Platelets 344 K/uL      MPV 9.9 fL      Differential Type Auto     nRBC 0.0 %      Immature Granulocytes 0.3 %      Neutrophils 67.4 %      Lymphocytes 21.8 %      Monocytes 9.2 %      Eosinophils 1.1 %      Basophils 0.2 %      Immature Granulocytes, Absolute 0.03 K/uL      Neutrophils, Absolute 7.18 K/uL      Lymphocytes, Absolute 2.32 K/uL      Monocytes, Absolute 0.98 K/uL      Eosinophils, Absolute 0.12 K/uL      Basophils, Absolute 0.02 K/uL             Imaging Results              CT ABDOMEN PELVIS WITH IV CONTRAST (Final result)  Result time 01/16/25 18:04:34      Final result                   Impression:    IMPRESSION: Left hydroureteronephrosis with an 8 mm calculus in the mid left  ureter, as well as a delayed nephrogram.    TECHNIQUE: CT of the abdomen and pelvis was performed with IV contrast without  adverse reaction. Oral contrast was not administered.    CT DOSE: One or more dose reduction techniques (e.g. automated exposure control,  adjustment of the mA and/or kV according to patient size, use of  iterative  reconstruction technique) were utilized for this examination.    ADMINISTERED CONTRAST AND DOSE: 100mL of iopamidoL (ISOVUE-370) 370 mg iodine  /mL (76 %) injection    COMMENT:    LOWER CHEST: unremarkable.    ABDOMEN:  LIVER: Steatosis.  BILE DUCTS: normal caliber.  GALLBLADDER: No calcified gallstones. Normal caliber wall.  PANCREAS: within normal limits.  SPLEEN: within normal limits.  ADRENALS: within normal limits.  KIDNEYS and URETERS: There is an 8 mm nonobstructing right lower pole calculus.  There is left hydronephrosis with an 8 mm calculus in the mid left ureter. There  is proximal hydroureter with urothelial thickening and enhancement and  perinephric and periureteral stranding. There are also 2 additional  nonobstructing left lower pole calculi, 3 mm and 7 mm. Mildly delayed left renal  enhancement relative to the right with a delayed nephrogram as well.  STOMACH: Unremarkable.    PELVIS:  REPRODUCTIVE ORGANS: no pelvic masses.  BLADDER: Underdistended which limits evaluation.    BOWEL: Postsurgical changes in the region of the rectosigmoid junction. Normal  caliber. Normal appendix. Colonic diverticulosis.  PERITONEUM: no ascites or free air, no fluid collection.  VESSELS: Within normal limits.  LYMPH NODES: No enlarged nodes.  RETROPERITONEUM: within normal limits.  ABDOMINAL WALL: within normal limits.  BONES: within normal limits for age. Normal vertebral body heights.                     Narrative:    CLINICAL HISTORY: LLQ abdominal pain    COMPARISON: Prior available studies.                                      ECG 12 lead   Independent Interpretation by ED Provider   Normal sinus rhythm at 83 bpm normal axis narrow QRS no ST segment changes          Scoring tools                                  ED Course & MDM   MDM / ED COURSE / CLINICAL IMPRESSION / DISPO   Vital Signs Review: Vital signs have been reviewed.   The oxygen saturation is SpO2: 95 % which is normal.    Medical Decision  Making  Problems Addressed:  Calculus of ureter: acute illness or injury  Hydronephrosis with urinary obstruction due to ureteral calculus: acute illness or injury    Amount and/or Complexity of Data Reviewed  Labs: ordered. Decision-making details documented in ED Course.  Radiology: ordered. Decision-making details documented in ED Course.  ECG/medicine tests: ordered and independent interpretation performed.    Risk  Prescription drug management.          ED Course as of 01/16/25 2007   Thu Jan 16, 2025   1622 I went to order the CT scan while I was with the patient and it pops up think she is allergic to iodine contrast.  Discussed this with patient.  Patient states when she had colon cancer and was being treated years ago she had numerous CAT scans with contrast and had no reaction.  1 time she had a CT scan with dye and 24 hours later broke out in a rash.  She believes it was poison ivy.  She saw her PCP who states he is unsure if it was poison ivy or an allergic reaction so put it in her chart.  Patient states she was prepped 1 time for an outpatient CT scan.  Discussed  options for CT here and risks and benefits.  Discussed we do a dry scan but there is always a chance we have to rescan her with contrast vs doing the 4-hour prep vs CT her with contrast and taking the chance that she has an allergic reaction. Discussed the risks of an allergic reaction including mild reaction like hives versus a severe reaction of angioedema and needing to be intubated and everything in between.  Patient would like to do the CT scan with IV contrast and not the prepped.  CT aware [DB]   1622 Creatinine(!): 1.5  Increased for 5 years ago  [DB]   1808 CT ABDOMEN PELVIS WITH IV CONTRAST  IMPRESSION: Left hydroureteronephrosis with an 8 mm calculus in the mid left  ureter, as well as a delayed nephrogram.   [DB]   1834 WBC, Urine(!): 35 TO 50 [DB]   1834 Squamous Epithelial(!): +2 [DB]   1834 Bacteria, Urine(!): Rare [DB]   1840  Page to urology  [DB]   8567 Discussed with Dr. Reed who recommends leaving up to the patient.  If patient feels comfortable going home I can prescribe antibiotics and have her he will have the office reach out tomorrow to schedule an appointment.  If she would rather stay then n.p.o. at midnight. [DB]   4059 Patient had no reaction to the CT dye.    Discussed results with patient.  She feels much better after the Toradol and feels comfortable going home.  Discussed results and reasons to be reevaluated.  All questions answered [DB]      ED Course User Index  [DB] Majo Champion PA C     Clinical Impression      Calculus of ureter   Hydronephrosis with urinary obstruction due to ureteral calculus     _________________       ED Disposition   Discharge                             Patient seen during a time of significantly increased volumes, increased boarding in ED, decreased capacity and staff which may have contributed to lengthened stay in ED. Portion of management and initial evaluation may have been done while in the waiting room because of this.  Extensive detailed charting also may be limited secondary to high ED volumes and may not reflect all conversations and decisions made between patient and provider.     This document was created using dragon dictation software.  There might be some typographical errors due to this technology.       Majo Champion PA C  01/16/25 2007

## 2025-01-17 LAB
ATRIAL RATE: 83
P AXIS: 59
PR INTERVAL: 154
QRS DURATION: 92
QT INTERVAL: 376
QTC CALCULATION(BAZETT): 441
R AXIS: 3
T WAVE AXIS: 41
VENTRICULAR RATE: 83

## 2025-01-17 NOTE — DISCHARGE INSTRUCTIONS
you have an 8 mm kidney stone in the left ureter that is alf down.  There is moderate hydronephrosis.  Her creatinine is elevated at 1.5     Drink plenty of fluids. Strain your urine to catch the stone.     Zofran is to be taken for nausea as needed every 8 hours.   Flomax is take nightly to help you attempt to pass the stone on your own.     Attempt ibuprofen (600mg) or tylenol (650) every 6 hours for pain. Percocet is for breakthrough pain (1 percocet contains 5mg oxycodone and 325 mg acetaminophen/tylenol). Do not take more than 3000 mg of tylenol in 24 hours.   Do not drive or work when taking Percocet, as this medication may cause drowsiness.      Take Ceftin twice a day for low chance of infection    Return to the ER for worsening pain, vomiting, or ANY fever over 100.3 °F.  Follow up with your family doctor and urologist for further evaluation.

## 2025-01-18 LAB
BACTERIA UR CULT: NORMAL
BACTERIA UR CULT: NORMAL

## 2025-01-27 ENCOUNTER — ANESTHESIA EVENT (OUTPATIENT)
Dept: OPERATING ROOM | Facility: HOSPITAL | Age: 55
Setting detail: HOSPITAL OUTPATIENT SURGERY
End: 2025-01-27
Payer: COMMERCIAL

## 2025-01-28 ENCOUNTER — PRE-ADMISSION TESTING (OUTPATIENT)
Dept: PREADMISSION TESTING | Age: 55
End: 2025-01-28
Payer: COMMERCIAL

## 2025-01-28 VITALS — WEIGHT: 236 LBS | HEIGHT: 67 IN | BODY MASS INDEX: 37.04 KG/M2

## 2025-01-28 RX ORDER — IBUPROFEN 600 MG/1
600 TABLET ORAL EVERY 6 HOURS PRN
COMMUNITY
End: 2025-03-06

## 2025-01-28 RX ORDER — TRAMADOL HYDROCHLORIDE 50 MG/1
50 TABLET ORAL EVERY 6 HOURS PRN
COMMUNITY
Start: 2025-01-22 | End: 2025-03-06

## 2025-01-28 ASSESSMENT — PAIN SCALES - GENERAL: PAINLEVEL_OUTOF10: 1

## 2025-01-28 NOTE — PRE-PROCEDURE INSTRUCTIONS
[address] [address]    1.       We will call you between 3 pm and 7 pm on January 28, 2025 to inform you of arrival time for your procedure. If you do not hear by 6PM, please call [phone] for arrival time.     2.        Please arrive through the Main Entrance of the [facility] (across from the parking garage) and report to the Surgery Registration Desk on the day of your procedure.    3. Please follow the following fasting guidelines:     No solid food EIGHT HOURS prior to arrival time on day of surgery.  6 ounces of clear liquids, meaning water or PLAIN black coffee WITHOUT any milk, cream, sugar, or sweetener are permitted up to TWO HOURS prior to arrival at the hospital.    4. Please take ONLY the following medications with a sip of water on the morning of your procedure: (populate names and/or NONE) Synthroid, Wellbutrin.    No NSAIDS, Aspirin, Advil, Aleve, Motrin, Ibuprofen, Herbal Supplements or Vitamins until after surgery. Tylenol is ok to take.    5. Other Instructions: You may brush your teeth the morning of the procedure. Rinse and spit, do not swallow.  Bring a list of your medications with dosages.  Use surgical wash as directed.     6. If you develop a cold, cough, fever, rash, or other symptom prior to the day of the procedure, please report it to your physician immediately.    7. If you need to cancel the procedure for any reason, please contact your physician.    8. Make arrangements to have safe transportation home accompanied by a responsible adult. If you have not arranged safe transportation home, your surgery will be cancelled. Safe transportation may include private vehicle, ride-share service, taxi and public transportation when accompanied by a responsible adult who will assist you home. A responsible adult is someone known to you and does not include the taxi, ride-share or public transit drive transporting you.    9.  If it is medically necessary  for you to have a longer stay, you will be informed as soon as the decision is made.    10. Only bring essential items to the hospital.  Do not wear or bring anything of value to the hospital including jewelry of any kind, money, or wallet. Do not wear make-up or contact lenses.  DO NOT BRING MEDICATIONS FROM HOME unless instructed to do so. DO bring your hearing aids, glasses, and a case    11. No lotion, creams, powders, or oils on skin the morning of procedure     12. Dress in comfortable clothes.    13.  If instructed, please bring a copy of your Advanced Directive (Living Will/Durable Power of ) on the day of your procedure.     14. Ensuring your safety at all times is a very important part of our [facility] Culture of Safety. After having surgery and sedation, you are at risk for falling and balance issues. Although you may feel awake, the effects of the medication can last up to 24 hours after anesthesia. If you need to use the bathroom during your recovery period, nursing staff will escort you there and stay with you to ensure your safety.    15. Refrain from drinking alcohol and smoking cigarettes for 24 hours prior to surgery.    16. Shower with antibacterial soap (Dial) the night before and morning of your procedure.      Above instructions reviewed with patient and patient acknowledges understanding.    Form explained by: Jodie Fenton RN

## 2025-01-29 ENCOUNTER — HOSPITAL ENCOUNTER (OUTPATIENT)
Facility: HOSPITAL | Age: 55
Setting detail: HOSPITAL OUTPATIENT SURGERY
Discharge: HOME | End: 2025-01-29
Attending: UROLOGY | Admitting: UROLOGY
Payer: COMMERCIAL

## 2025-01-29 ENCOUNTER — ANESTHESIA (OUTPATIENT)
Dept: OPERATING ROOM | Facility: HOSPITAL | Age: 55
Setting detail: HOSPITAL OUTPATIENT SURGERY
End: 2025-01-29
Payer: COMMERCIAL

## 2025-01-29 VITALS
SYSTOLIC BLOOD PRESSURE: 128 MMHG | WEIGHT: 233 LBS | TEMPERATURE: 97.4 F | HEIGHT: 67 IN | OXYGEN SATURATION: 94 % | DIASTOLIC BLOOD PRESSURE: 74 MMHG | BODY MASS INDEX: 36.57 KG/M2 | RESPIRATION RATE: 16 BRPM | HEART RATE: 93 BPM

## 2025-01-29 DIAGNOSIS — N20.1 URETERAL STONE: ICD-10-CM

## 2025-01-29 LAB
GLUCOSE BLD-MCNC: 132 MG/DL (ref 70–99)
GLUCOSE BLD-MCNC: 168 MG/DL (ref 70–99)
POCT TEST: ABNORMAL
POCT TEST: ABNORMAL

## 2025-01-29 PROCEDURE — 0T778DZ DILATION OF LEFT URETER WITH INTRALUMINAL DEVICE, VIA NATURAL OR ARTIFICIAL OPENING ENDOSCOPIC: ICD-10-PCS | Performed by: UROLOGY

## 2025-01-29 PROCEDURE — 37000001 HC ANESTHESIA GENERAL: Performed by: UROLOGY

## 2025-01-29 PROCEDURE — 36000003 HC OR LEVEL 3 INITIAL 30MIN: Performed by: UROLOGY

## 2025-01-29 PROCEDURE — C1758 CATHETER, URETERAL: HCPCS | Performed by: UROLOGY

## 2025-01-29 PROCEDURE — 0TC18ZZ EXTIRPATION OF MATTER FROM LEFT KIDNEY, VIA NATURAL OR ARTIFICIAL OPENING ENDOSCOPIC: ICD-10-PCS | Performed by: UROLOGY

## 2025-01-29 PROCEDURE — 71000001 HC PACU PHASE 1 INITIAL 30MIN: Performed by: UROLOGY

## 2025-01-29 PROCEDURE — 25000000 HC PHARMACY GENERAL: Performed by: NURSE ANESTHETIST, CERTIFIED REGISTERED

## 2025-01-29 PROCEDURE — C1769 GUIDE WIRE: HCPCS | Performed by: UROLOGY

## 2025-01-29 PROCEDURE — 82365 CALCULUS SPECTROSCOPY: CPT | Performed by: UROLOGY

## 2025-01-29 PROCEDURE — 27200000 HC STERILE SUPPLY: Performed by: UROLOGY

## 2025-01-29 PROCEDURE — 0TC78ZZ EXTIRPATION OF MATTER FROM LEFT URETER, VIA NATURAL OR ARTIFICIAL OPENING ENDOSCOPIC: ICD-10-PCS | Performed by: UROLOGY

## 2025-01-29 PROCEDURE — C2617 STENT, NON-COR, TEM W/O DEL: HCPCS | Performed by: UROLOGY

## 2025-01-29 PROCEDURE — 71000011 HC PACU PHASE 1 EA ADDL MIN: Performed by: UROLOGY

## 2025-01-29 PROCEDURE — 71000002 HC PACU PHASE 2 INITIAL 30MIN: Performed by: UROLOGY

## 2025-01-29 PROCEDURE — 63600000 HC DRUGS/DETAIL CODE: Mod: JW | Performed by: NURSE ANESTHETIST, CERTIFIED REGISTERED

## 2025-01-29 PROCEDURE — 63600000 HC DRUGS/DETAIL CODE: Mod: JZ | Performed by: UROLOGY

## 2025-01-29 PROCEDURE — 25800000 HC PHARMACY IV SOLUTIONS: Performed by: NURSE ANESTHETIST, CERTIFIED REGISTERED

## 2025-01-29 PROCEDURE — 36000013 HC OR LEVEL 3 EA ADDL MIN: Performed by: UROLOGY

## 2025-01-29 PROCEDURE — 63700000 HC SELF-ADMINISTRABLE DRUG: Performed by: UROLOGY

## 2025-01-29 DEVICE — STENT PIGTAIL SOF-FLEX DBL 6FR X 26CM: Type: IMPLANTABLE DEVICE | Status: FUNCTIONAL

## 2025-01-29 RX ORDER — FENTANYL CITRATE 50 UG/ML
50 INJECTION, SOLUTION INTRAMUSCULAR; INTRAVENOUS EVERY 5 MIN PRN
Status: DISCONTINUED | OUTPATIENT
Start: 2025-01-29 | End: 2025-01-29 | Stop reason: HOSPADM

## 2025-01-29 RX ORDER — DEXTROSE 50 % IN WATER (D50W) INTRAVENOUS SYRINGE
25 AS NEEDED
Status: DISCONTINUED | OUTPATIENT
Start: 2025-01-29 | End: 2025-01-29 | Stop reason: HOSPADM

## 2025-01-29 RX ORDER — MIDAZOLAM HYDROCHLORIDE 2 MG/2ML
INJECTION, SOLUTION INTRAMUSCULAR; INTRAVENOUS AS NEEDED
Status: DISCONTINUED | OUTPATIENT
Start: 2025-01-29 | End: 2025-01-29 | Stop reason: SURG

## 2025-01-29 RX ORDER — ONDANSETRON HYDROCHLORIDE 2 MG/ML
4 INJECTION, SOLUTION INTRAVENOUS
Status: DISCONTINUED | OUTPATIENT
Start: 2025-01-29 | End: 2025-01-29 | Stop reason: HOSPADM

## 2025-01-29 RX ORDER — ONDANSETRON HYDROCHLORIDE 2 MG/ML
INJECTION, SOLUTION INTRAVENOUS AS NEEDED
Status: DISCONTINUED | OUTPATIENT
Start: 2025-01-29 | End: 2025-01-29 | Stop reason: SURG

## 2025-01-29 RX ORDER — DEXAMETHASONE SODIUM PHOSPHATE 4 MG/ML
INJECTION, SOLUTION INTRA-ARTICULAR; INTRALESIONAL; INTRAMUSCULAR; INTRAVENOUS; SOFT TISSUE AS NEEDED
Status: DISCONTINUED | OUTPATIENT
Start: 2025-01-29 | End: 2025-01-29 | Stop reason: SURG

## 2025-01-29 RX ORDER — IBUPROFEN 200 MG
16-32 TABLET ORAL AS NEEDED
Status: DISCONTINUED | OUTPATIENT
Start: 2025-01-29 | End: 2025-01-29 | Stop reason: HOSPADM

## 2025-01-29 RX ORDER — ACETAMINOPHEN 325 MG/1
650 TABLET ORAL ONCE
Status: COMPLETED | OUTPATIENT
Start: 2025-01-29 | End: 2025-01-29

## 2025-01-29 RX ORDER — LIDOCAINE HYDROCHLORIDE 10 MG/ML
INJECTION, SOLUTION EPIDURAL; INFILTRATION; INTRACAUDAL; PERINEURAL AS NEEDED
Status: DISCONTINUED | OUTPATIENT
Start: 2025-01-29 | End: 2025-01-29 | Stop reason: SURG

## 2025-01-29 RX ORDER — PHENAZOPYRIDINE HYDROCHLORIDE 200 MG/1
200 TABLET, FILM COATED ORAL 3 TIMES DAILY PRN
Qty: 10 TABLET | Refills: 0 | Status: SHIPPED | OUTPATIENT
Start: 2025-01-29 | End: 2025-02-01

## 2025-01-29 RX ORDER — FENTANYL CITRATE 50 UG/ML
INJECTION, SOLUTION INTRAMUSCULAR; INTRAVENOUS AS NEEDED
Status: DISCONTINUED | OUTPATIENT
Start: 2025-01-29 | End: 2025-01-29 | Stop reason: SURG

## 2025-01-29 RX ORDER — KETOROLAC TROMETHAMINE 15 MG/ML
INJECTION, SOLUTION INTRAMUSCULAR; INTRAVENOUS AS NEEDED
Status: DISCONTINUED | OUTPATIENT
Start: 2025-01-29 | End: 2025-01-29 | Stop reason: SURG

## 2025-01-29 RX ORDER — ACETAMINOPHEN 500 MG
1000 TABLET ORAL EVERY 6 HOURS PRN
COMMUNITY

## 2025-01-29 RX ORDER — SODIUM CHLORIDE 9 MG/ML
INJECTION, SOLUTION INTRAVENOUS CONTINUOUS PRN
Status: DISCONTINUED | OUTPATIENT
Start: 2025-01-29 | End: 2025-01-29 | Stop reason: SURG

## 2025-01-29 RX ORDER — OXYBUTYNIN CHLORIDE 5 MG/1
5 TABLET ORAL 3 TIMES DAILY PRN
Qty: 15 TABLET | Refills: 1 | Status: SHIPPED | OUTPATIENT
Start: 2025-01-29 | End: 2025-03-06

## 2025-01-29 RX ORDER — DEXTROSE 40 %
15-30 GEL (GRAM) ORAL AS NEEDED
Status: DISCONTINUED | OUTPATIENT
Start: 2025-01-29 | End: 2025-01-29 | Stop reason: HOSPADM

## 2025-01-29 RX ORDER — PROPOFOL 10 MG/ML
INJECTION, EMULSION INTRAVENOUS AS NEEDED
Status: DISCONTINUED | OUTPATIENT
Start: 2025-01-29 | End: 2025-01-29 | Stop reason: SURG

## 2025-01-29 RX ORDER — HYDROMORPHONE HYDROCHLORIDE 1 MG/ML
0.5 INJECTION, SOLUTION INTRAMUSCULAR; INTRAVENOUS; SUBCUTANEOUS
Status: DISCONTINUED | OUTPATIENT
Start: 2025-01-29 | End: 2025-01-29 | Stop reason: HOSPADM

## 2025-01-29 RX ADMIN — ONDANSETRON 4 MG: 2 INJECTION INTRAMUSCULAR; INTRAVENOUS at 10:29

## 2025-01-29 RX ADMIN — ACETAMINOPHEN 650 MG: 325 TABLET ORAL at 13:00

## 2025-01-29 RX ADMIN — MIDAZOLAM 2 MG: 1 INJECTION INTRAMUSCULAR; INTRAVENOUS at 09:56

## 2025-01-29 RX ADMIN — FENTANYL CITRATE 100 MCG: 50 INJECTION, SOLUTION INTRAMUSCULAR; INTRAVENOUS at 09:59

## 2025-01-29 RX ADMIN — DEXAMETHASONE SODIUM PHOSPHATE 4 MG: 4 INJECTION, SOLUTION INTRA-ARTICULAR; INTRALESIONAL; INTRAMUSCULAR; INTRAVENOUS; SOFT TISSUE at 09:56

## 2025-01-29 RX ADMIN — CEFTRIAXONE SODIUM 2 G: 2 INJECTION, POWDER, FOR SOLUTION INTRAMUSCULAR; INTRAVENOUS at 09:53

## 2025-01-29 RX ADMIN — LIDOCAINE HYDROCHLORIDE 3 ML: 10 INJECTION, SOLUTION EPIDURAL; INFILTRATION; INTRACAUDAL; PERINEURAL at 10:01

## 2025-01-29 RX ADMIN — SODIUM CHLORIDE: 9 INJECTION, SOLUTION INTRAVENOUS at 09:50

## 2025-01-29 RX ADMIN — KETOROLAC TROMETHAMINE 15 MG: 15 INJECTION, SOLUTION INTRAMUSCULAR; INTRAVENOUS at 10:55

## 2025-01-29 RX ADMIN — FENTANYL CITRATE 25 MCG: 50 INJECTION, SOLUTION INTRAMUSCULAR; INTRAVENOUS at 10:40

## 2025-01-29 RX ADMIN — PROPOFOL 200 MG: 10 INJECTION, EMULSION INTRAVENOUS at 10:01

## 2025-01-29 ASSESSMENT — ENCOUNTER SYMPTOMS: DEPRESSION: 1

## 2025-01-29 NOTE — ANESTHESIOLOGIST PRE-PROCEDURE ATTESTATION
Pre-Procedure Patient Identification:  I am the Primary Anesthesiologist and have identified the patient on 01/29/25 at 9:25 AM.   I have confirmed the procedure(s) will be performed by the following surgeon/proceduralist Leo Reed MD.

## 2025-01-29 NOTE — ANESTHESIA PREPROCEDURE EVALUATION
Relevant Problems   Other   (+) Cancer of sigmoid (CMS/HCC)       Anesthesia ROS/MED HX    Anesthesia History - neg  Pulmonary - neg  Neuro/Psych    Depression   Anxiety  Cardiovascular   dyslipidemia  Hematological - neg  GI/Hepatic- neg  Musculoskeletal- neg  Renal Disease- neg  Endo/Other- neg       Past Surgical History   Procedure Laterality Date     section      Colon surgery  05/15/2013    Colonoscopy to the base of the cecum. Limited ileoscopy, biopsy, flat area of the base of the cecum.     Colon surgery  2015    Colonoscopy to the base of the cecum, normal anastomosis.    Colon surgery  2011    Colonoscopy to the base of the cecum    Colon surgery  07/15/2009    Colonoscopy to the base of the cecum    Colon surgery  2008    Colonoscopy to the base of the cecum    Colon surgery  2008    Exploratory laparoscopy with laparoscopic Tequila's resection, takedown of the splenic flexure, resection of residual sigmoid colon,portion of rectum with descending colorectal anastomosis.     Colon surgery  2007    Total colonoscopy via stoma and flexible sigmoidoscopy per rectum with cold forceps biopsy.     Colon surgery  2017    Colonoscopy to the base of the cecum    Colonoscopy  2024    Dr Almanza    COLONOSCOPY N/A 12/15/2021    Performed by Brown Almanza MD at [facility]    Colonoscopy N/A 2019    Performed by Brown Almanza MD at [facility]    Cystoscopy  2021    x3    FLEXIBLE COLONOSCOPY PROXIMAL TO SPLENIC FLEXURE WITH BIOPSY N/A 2024    Performed by rBown Almanza MD at [facility]    Lithotripsy      [place] tooth extraction         Physical Exam    Airway   Mallampati: II   TM distance: >3 FB   Neck ROM: full  Cardiovascular - normal   Rhythm: regular   Rate: normalPulmonary - normal   clear to auscultation  Dental - normal        Anesthesia Plan    Plan: general    Technique: general LMA     Lines and Monitors: PIV     Airway: natural airway / supplemental oxygen     2 ASA  Anesthetic plan and risks discussed with: patient  Postop Plan:   Patient Disposition: phase II then home   Pain Management: IV analgesics

## 2025-01-29 NOTE — INTERVAL H&P NOTE
H&P updated. The patient was examined and Plan left uscope laser lithotripsy basket stone extraction and stent insertion

## 2025-01-29 NOTE — ANESTHESIA PROCEDURE NOTES
Airway  Urgency: elective    Start Time: 1/29/2025 10:02 AM  Airway not difficult    General Information and Staff    Patient location during procedure: OR  Anesthesiologist: Jagdeep Johnson MD  Resident/CRNA: Donna Nelson CRNA  Performed: resident/CRNA   Performed by: Donna Nelson CRNA  Authorized by: Jagdeep Johnson MD      Indications and Patient Condition  Indications for airway management: anesthesia  Sedation level: general  Preoxygenated: yes  Patient position: sniffing  Mask difficulty assessment: 0 - not attempted    Final Airway Details  Final airway type: supraglottic airway      Successful airway: unique  Size 4     Number of attempts at approach: 1  Atraumatic airway insertion

## 2025-01-29 NOTE — OP NOTE
Name: Mercy Vizcarra  YOB: 1970  MRN: [id number]  Date of Surgery:  1/29/2025    ATTENDING SURGEON: Leo Reed MD      PROCEDURE: left ureteroscopy,  laser lithotripsy, basket stone extraction, left JJ stent placement    ANETHESIA: General  ANTIBIOTICS: Ceftriaxone  EBL: minimal  COMPLICATIONS:  None   DRAINS: 6x26 JJ stent  SPECIMENS: left ureteral stone    PRE-OPERATIVE DIAGNOSIS: left ureteral and renal stones   POST-OPERATIVE DIAGNOSIS: same    INDICATIONS: Mercy Vizcarra is a 54 y.o. with a history of left ureteral and renal stones     PROCEDURE IN DETAIL: The patient was met in the preoperative holding area. her identity was confirmed by name and date of birth by the preoperative staff. All questions were answered to her satisfaction and informed consent was then obtained. The patient was then brought back to the operating room and placed supine on the operating room table. Following an uneventful induction of general anesthesia, the patient was placed in a relaxed dorsal lithotomy position with careful attention being paid to pad all pressure points and to ensure that no joint was flexed beyond 90 degrees. The patient was prepped and draped in normal sterile fashion, and a time out for patient safety was performed.    At this point, a 22-Greenlandic rigid cystoscope was inserted into the bladder and panendoscopy was performed with a 30 degree lens. The  urethra was noted to be normal in course and caliber and the  bladder outlet was non occlusive. The ureteral orifices were present in orthotopic location bilaterally. The bladder capacity was  adequate with no trabeculation. On evaluation of the mucosa, there was   no evidence of erythematous or papillary lesions concerning for malignancy. Therefore the bladder was unremarkable.     A sensor wire was then inserted into the left ureter up to the renal pelvis under fluoroscopic guidance. The stone was visible on fluoroscopy. An  open-ended catheter was exchanged for the wire and a gentle retrograde pyelogram was performed revealing moderate hydronephrosis. A guidewire was exchanged for the open-ended catheter.     A semirigid ureteroscope was then inserted into the bladder, a guidewire was inserted through the ureteroscope, and the ureteroscope was inserted into the ureteral orifice between the existing guidewire and this second guidewire.     The ureteroscope was advanced until a stone was encountered in the left distal ureter. The stone was fragmented with the holmium laser fiber and extracted with basket      With no remaining fragments in the ureter, the semirigid scope was exchanged for a guide wire which was advanced into the kidney under fluoroscopic guidance. The wire was exchanged for the flexible ureteroscope was advanced into kidney. Systematic pyeloscopy was performed revealing 2 lower pole stones. All remaining fragments were further broken with the laser The scope was removed. A 6x26 sent was inserted over the safety wire.        The patient was then taken out of relaxed dorsal lithotomy position and awoken uneventfully from general anesthesia. The patient was transferred to the postanesthesia care unit in good and stable condition.       DISPOSITION: the patient will have her stent removed in about a week

## 2025-01-29 NOTE — OR SURGEON
Pre-Procedure patient identification:  I am the primary operating surgeon/proceduralist and I have reviewed the applicable pathology reports and radiology studies for this procedure. I have identified the patient and confirmed laterality is left on 01/29/25 at 9:42 AM Leo Reed MD  Phone Number: [phone]

## 2025-01-31 ASSESSMENT — PAIN SCALES - GENERAL: PAIN_LEVEL: 3

## 2025-01-31 NOTE — ANESTHESIA POSTPROCEDURE EVALUATION
Patient: Mercy Vizcarra    Procedure Summary       Date: 01/29/25 Room / Location:  OR  /  OR    Anesthesia Start: 0956 Anesthesia Stop: 1104    Procedure: CYSTO, URETEROSCOPY, LASER LITHOTRIPSY, STENT PLACEMENT,  RETROGRADE PYELOGRAM (Left) Diagnosis:       Ureteral stone      (Ureteral stone [N20.1])    Surgeons: Leo Reed MD Responsible Provider: Jagdeep Johnson MD    Anesthesia Type: general ASA Status: 2            Anesthesia Type: general  PACU Vitals  1/29/2025 1058 - 1/29/2025 1158        1/29/2025  1100 1/29/2025  1115 1/29/2025  1130 1/29/2025  1145    BP: 127/76 122/64 114/60 126/66    Temp: 36.7 °C (98 °F) -- -- --    Pulse: 88 88 86 86    Resp: 14 13 13 15    SpO2: 96 % 96 % 97 % 93 %              Anesthesia Post Evaluation    Pain score: 3  Pain management: adequate  Patient location during evaluation: PACU  Patient participation: complete - patient participated  Level of consciousness: awake and alert  Cardiovascular status: acceptable  Airway Patency: adequate  Respiratory status: acceptable  Hydration status: acceptable  Anesthetic complications: no

## 2025-02-05 LAB
COMPN STONE: NORMAL
ORIGIN STONE: NORMAL
WT STONE: 0.07 G

## 2025-02-24 ENCOUNTER — TRANSCRIBE ORDERS (OUTPATIENT)
Dept: SCHEDULING | Age: 55
End: 2025-02-24

## 2025-02-24 DIAGNOSIS — N20.1 CALCULUS OF URETER: Primary | ICD-10-CM

## 2025-02-28 RX ORDER — LEVOTHYROXINE SODIUM 88 UG/1
88 TABLET ORAL DAILY
Qty: 30 TABLET | Refills: 1 | Status: SHIPPED | OUTPATIENT
Start: 2025-02-28

## 2025-03-05 ENCOUNTER — TRANSCRIBE ORDERS (OUTPATIENT)
Dept: REGISTRATION | Facility: HOSPITAL | Age: 55
End: 2025-03-05

## 2025-03-05 ENCOUNTER — HOSPITAL ENCOUNTER (OUTPATIENT)
Dept: RADIOLOGY | Facility: HOSPITAL | Age: 55
Discharge: HOME | End: 2025-03-05
Attending: ADVANCED PRACTICE MIDWIFE
Payer: COMMERCIAL

## 2025-03-05 DIAGNOSIS — Z12.31 ENCOUNTER FOR SCREENING MAMMOGRAM FOR MALIGNANT NEOPLASM OF BREAST: Primary | ICD-10-CM

## 2025-03-05 DIAGNOSIS — Z12.31 ENCOUNTER FOR SCREENING MAMMOGRAM FOR MALIGNANT NEOPLASM OF BREAST: ICD-10-CM

## 2025-03-05 PROCEDURE — 77067 SCR MAMMO BI INCL CAD: CPT

## 2025-03-06 ENCOUNTER — OFFICE VISIT (OUTPATIENT)
Dept: PRIMARY CARE | Facility: CLINIC | Age: 55
End: 2025-03-06
Payer: COMMERCIAL

## 2025-03-06 VITALS
DIASTOLIC BLOOD PRESSURE: 80 MMHG | HEIGHT: 67 IN | BODY MASS INDEX: 37.35 KG/M2 | WEIGHT: 238 LBS | RESPIRATION RATE: 14 BRPM | HEART RATE: 96 BPM | OXYGEN SATURATION: 98 % | TEMPERATURE: 98 F | SYSTOLIC BLOOD PRESSURE: 126 MMHG

## 2025-03-06 DIAGNOSIS — M54.12 CERVICAL RADICULOPATHY: Primary | ICD-10-CM

## 2025-03-06 DIAGNOSIS — N20.0 KIDNEY STONES: ICD-10-CM

## 2025-03-06 PROCEDURE — 99213 OFFICE O/P EST LOW 20 MIN: CPT | Performed by: PHYSICIAN ASSISTANT

## 2025-03-06 PROCEDURE — 3008F BODY MASS INDEX DOCD: CPT | Performed by: PHYSICIAN ASSISTANT

## 2025-03-06 RX ORDER — NAPROXEN 500 MG/1
500 TABLET ORAL
Qty: 30 TABLET | Refills: 0 | Status: SHIPPED | OUTPATIENT
Start: 2025-03-06

## 2025-03-06 ASSESSMENT — ENCOUNTER SYMPTOMS
NUMBNESS: 1
DIZZINESS: 0
WEAKNESS: 0
NECK STIFFNESS: 1
FEVER: 0
HEADACHES: 0
PALPITATIONS: 0
LIGHT-HEADEDNESS: 0
FATIGUE: 0
ARTHRALGIAS: 0
ABDOMINAL PAIN: 0
NECK PAIN: 1

## 2025-03-06 ASSESSMENT — PATIENT HEALTH QUESTIONNAIRE - PHQ9: SUM OF ALL RESPONSES TO PHQ9 QUESTIONS 1 & 2: 0

## 2025-03-06 NOTE — PROGRESS NOTES
Consent obtained from patient and all parties present in the room? yes    I have obtained the consent of everyone present in the room to make an audio recording of this visit to assist me in documenting the encounter in the EMR.      NEEL Blue    [facility] in [address] [address]   P: [phone]  F: [phone]       Subjective     Patient ID: Mercy Vizcarra, : 1970 is a 54 y.o. female who presents for Paresthesia (Whole arm tingling and numbness varies x 3 months ( Let arm) /Almost every day /Worsening in the several weeks )    History of Present Illness  The patient presents for evaluation of left arm pain.    She began experiencing persistent tingling in her left arm in early 2024, described as a sensation akin to the arm awakening from sleep. This tingling is predominantly localized between the shoulder and elbow but occasionally extends to the hand. She also reports intermittent numbness, although tactile sensation remains intact. There are no mobility issues or weakness reported. She experiences occasional pain, which she characterizes as similar to muscle tightness rather than sharp pain. Despite attempts at relief through massage, the symptoms persist. She has been using a heating pad for the past 2 to 3 weeks due to daytime soreness and difficulty falling asleep. The symptoms are not exacerbated by movement or certain positions and appear to be random in nature. Initially, the symptoms would fluctuate throughout the day, but they are now constant, with varying intensity. For example, the discomfort was severe yesterday but mild this morning. The pain does not radiate into her fingers or beyond her wrist. She reports no chest pain or shortness of breath and notes that the symptoms can occur even during periods of rest. She occasionally experiences a sensation akin to twitching but does not observe any visible  twitching. She also reports that her left side feels tighter than her right, a recent development. She has not taken any medication for these symptoms.      She has a history of significant kidney stones, which led to an emergency room visit in January due to escalating pain. An EKG performed at that time was normal. Troponins were also negative - she did mention her arm discomfort in the ER.     Denies CP, SOB. Symptoms are not exertional.     Supplemental Information  She sought care from a spine specialist in  [place]  for severe back pain, which was attributed to arthritis in her lower back. She received an injection at that time, which resolved the pain.    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 Limited ileoscopy, biopsy, flat area of the base of the cecum.     Colon surgery  2015    Colonoscopy to the base of the cecum, normal anastomosis.    Colon surgery  2011    Colonoscopy to the base of the cecum    Colon surgery  07/15/2009    Colonoscopy to the base of the cecum    Colon surgery  2008    Colonoscopy to the base of the cecum    Colon surgery  2008    Exploratory laparoscopy with laparoscopic Tequila's resection, takedown of the splenic flexure, resection of residual sigmoid colon,portion of rectum with descending colorectal anastomosis.     Colon surgery  2007    Total colonoscopy via stoma and flexible sigmoidoscopy per  rectum with cold forceps biopsy.     Colon surgery  06/07/2017    Colonoscopy to the base of the cecum    Colonoscopy  05/2024    Dr Almanza    COLONOSCOPY N/A 12/15/2021    Performed by Brown Almanza MD at [facility]    Colonoscopy N/A 6/12/2019    Performed by Brown Almanza MD at [facility]    CYSTO, URETEROSCOPY, LASER LITHOTRIPSY, STENT PLACEMENT,  RETROGRADE PYELOGRAM Left 1/29/2025    Performed by Leo Reed MD at  OR    Cystoscopy  2021    x3    FLEXIBLE COLONOSCOPY PROXIMAL TO SPLENIC FLEXURE WITH BIOPSY N/A 5/22/2024    Performed by Brown Almanza MD at [facility]    Lithotripsy      [place] tooth extraction       Social History     Tobacco Use    Smoking status: Never    Smokeless tobacco: Never   Vaping Use    Vaping status: Never Used   Substance Use Topics    Alcohol use: Never    Drug use: Never     Family History   Problem Relation Name Age of Onset    Stroke Biological Mother      Atrial fibrillation Biological Mother      Cardiomyopathy Biological Mother      Stroke Biological Father      Heart disease Biological Father      Glaucoma Biological Father      Colon cancer Paternal Grandfather       Allergies   Allergen Reactions    Iodinated Contrast Media Rash and Hives     Pt had a rash after CT dye. Pt  was prepped with prednisone for her last CT with dye. Pt claims that she had poison ivy at the time of having the CT dye and was not actually allergic. Explained to the pt the dye allergy was documented in our system and that she will require prednisone prep prior to CT scans with dye.    No prep (per provider and patient request) and no reaction to CT abd/pelvis done on 1/16/2025 CT Tech [facility]    Penicillins Rash and Hives    Sulfa (Sulfonamide Antibiotics) Rash, Hives and Other (see comments)     from enrollment package    Gadolinium-Containing Contrast Media Other (see comments)    Adhesive Tape-Silicones Rash       Current Outpatient Medications:     acetaminophen (TYLENOL) 500 mg tablet, Take 1,000 mg  by mouth every 6 (six) hours as needed for mild pain., Disp: , Rfl:     atorvastatin (LIPITOR) 40 mg tablet, Take 1 tablet (40 mg total) by mouth daily. (Patient taking differently: Take 40 mg by mouth nightly.), Disp: 90 tablet, Rfl: 1    buPROPion XL (WELLBUTRIN XL) 150 mg 24 hr tablet, Take 150 mg by mouth daily., Disp: , Rfl:     escitalopram (LEXAPRO) 20 mg tablet, Take 20 mg by mouth nightly., Disp: , Rfl: 5    levothyroxine (SYNTHROID) 88 mcg tablet, TAKE 1 TABLET (88 MCG TOTAL) BY MOUTH DAILY., Disp: 30 tablet, Rfl: 1    metFORMIN (GLUCOPHAGE) 500 mg tablet, Take 1 tablet (500 mg total) by mouth 2 (two) times a day., Disp: 180 tablet, Rfl: 1    naproxen (NAPROSYN) 500 mg tablet, Take 1 tablet (500 mg total) by mouth 2 (two) times a day with meals., Disp: 30 tablet, Rfl: 0    VITAMIN B COMPLEX ORAL, Take 1 tablet by mouth daily., Disp: , Rfl:     Review of Systems   Constitutional:  Negative for fatigue and fever.   Cardiovascular:  Negative for chest pain and palpitations.   Gastrointestinal:  Negative for abdominal pain.   Musculoskeletal:  Positive for neck pain (left sided stiffness) and neck stiffness. Negative for arthralgias.   Skin:  Negative for rash.   Neurological:  Positive for numbness. Negative for dizziness, weakness, light-headedness and headaches.       Objective   Wt Readings from Last 3 Encounters:   03/06/25 108 kg (238 lb)   01/29/25 106 kg (233 lb)   01/28/25 107 kg (236 lb)     BP Readings from Last 3 Encounters:   03/06/25 126/80   01/29/25 128/74   01/16/25 138/77     Body mass index is 37.28 kg/m².    Physical Exam  Vitals and nursing note reviewed.   Constitutional:       Appearance: Normal appearance.   Cardiovascular:      Rate and Rhythm: Normal rate and regular rhythm.      Pulses: Normal pulses.      Heart sounds: Normal heart sounds.   Pulmonary:      Effort: Pulmonary effort is normal.      Breath sounds: Normal breath sounds.   Musculoskeletal:         General: Normal range  of motion.      Comments: Neck nontender to palpation   Skin:     General: Skin is warm and dry.   Neurological:      General: No focal deficit present.      Mental Status: She is alert and oriented to person, place, and time.      Sensory: No sensory deficit.      Motor: No weakness.      Coordination: Coordination normal.   Psychiatric:         Mood and Affect: Mood normal.         Behavior: Behavior normal.         Thought Content: Thought content normal.         Judgment: Judgment normal.           Results  Laboratory Studies  Troponin was negative. CBC looked good.    Testing  EKG from the ER was normal.       Assessment & Plan  1. Left arm radiculopathy:   - Prescribe naproxen BID, discussed steroids but pt would like to try Naproxen first  - Order MRI of the cervical spine to investigate the cause of the symptoms.  - Refer to [facility] for specialist consultation.  - Consider a short course of steroids if naproxen does not alleviate the symptoms.    2. Kidney stones: History of significant kidney stones, which led to an emergency room visit due to escalating pain. An EKG performed at that time was normal. Troponins also normal.  Has been seeing Urology.   - Scheduled for a follow-up CT scan in approximately 1.5 weeks.    Follow-up  - Follow-up with [facility] for specialist consultation.  - Follow-up CT scan for kidney stones in approximately 1.5 weeks.    Diagnoses and all orders for this visit:    Cervical radiculopathy (Primary)  -     MRI CERVICAL SPINE WITHOUT CONTRAST; Future  -     Ambulatory referral to Orthopedic Surgery; Future  -     naproxen (NAPROSYN) 500 mg tablet; Take 1 tablet (500 mg total) by mouth 2 (two) times a day with meals.    Kidney stones             NEEL Blue      3/6/2025

## 2025-03-20 ENCOUNTER — HOSPITAL ENCOUNTER (OUTPATIENT)
Dept: RADIOLOGY | Age: 55
Discharge: HOME | End: 2025-03-20
Attending: PHYSICIAN ASSISTANT
Payer: COMMERCIAL

## 2025-03-20 DIAGNOSIS — M54.12 CERVICAL RADICULOPATHY: ICD-10-CM

## 2025-03-28 ENCOUNTER — RESULTS FOLLOW-UP (OUTPATIENT)
Dept: PRIMARY CARE | Facility: CLINIC | Age: 55
End: 2025-03-28

## 2025-04-23 RX ORDER — LEVOTHYROXINE SODIUM 88 UG/1
88 TABLET ORAL DAILY
Qty: 30 TABLET | Refills: 2 | Status: SHIPPED | OUTPATIENT
Start: 2025-04-23

## 2025-04-23 NOTE — TELEPHONE ENCOUNTER
"Medicine Refill Request    Last Office Visit: 3/6/2025   Last Consult Visit: Visit date not found  Last Telemedicine Visit: Visit date not found    Next Appointment: Visit date not found      Current Outpatient Medications:     acetaminophen (TYLENOL) 500 mg tablet, Take 1,000 mg by mouth every 6 (six) hours as needed for mild pain., Disp: , Rfl:     atorvastatin (LIPITOR) 40 mg tablet, Take 1 tablet (40 mg total) by mouth daily. (Patient taking differently: Take 40 mg by mouth nightly.), Disp: 90 tablet, Rfl: 1    buPROPion XL (WELLBUTRIN XL) 150 mg 24 hr tablet, Take 150 mg by mouth daily., Disp: , Rfl:     escitalopram (LEXAPRO) 20 mg tablet, Take 20 mg by mouth nightly., Disp: , Rfl: 5    levothyroxine (SYNTHROID) 88 mcg tablet, TAKE 1 TABLET (88 MCG TOTAL) BY MOUTH DAILY., Disp: 30 tablet, Rfl: 1    metFORMIN (GLUCOPHAGE) 500 mg tablet, Take 1 tablet (500 mg total) by mouth 2 (two) times a day., Disp: 180 tablet, Rfl: 1    naproxen (NAPROSYN) 500 mg tablet, Take 1 tablet (500 mg total) by mouth 2 (two) times a day with meals., Disp: 30 tablet, Rfl: 0    VITAMIN B COMPLEX ORAL, Take 1 tablet by mouth daily., Disp: , Rfl:     BP Readings from Last 3 Encounters:   03/06/25 126/80   01/29/25 128/74   01/16/25 138/77       Recent Lab results:  No results found for: \"CHOL\", No results found for: \"HDL\", No results found for: \"LDLCALC\", No results found for: \"TRIG\"     Lab Results   Component Value Date    GLUCOSE 133 (H) 01/16/2025   , No results found for: \"HGBA1C\"      Lab Results   Component Value Date    CREATININE 1.5 (H) 01/16/2025       No results found for: \"TSH\"      No results found for: \"HGBA1C\"    "

## 2025-06-23 ENCOUNTER — TELEPHONE (OUTPATIENT)
Dept: PRIMARY CARE | Facility: CLINIC | Age: 55
End: 2025-06-23
Payer: COMMERCIAL

## 2025-06-23 RX ORDER — ATORVASTATIN CALCIUM 40 MG/1
40 TABLET, FILM COATED ORAL NIGHTLY
Qty: 90 TABLET | Refills: 1 | Status: SHIPPED | OUTPATIENT
Start: 2025-06-23 | End: 2025-06-24 | Stop reason: SDUPTHER

## 2025-06-23 NOTE — TELEPHONE ENCOUNTER
Patient is due for a physical, left her a voicemail to call to schedule  Received fax from pharmacy requesting refill on pts medication(s). Pt was last seen in office on 9/22/2021  and has a follow up scheduled for 7/3/2022. Will send request to  [facility]  for authorization.      Requested Prescriptions     Pending Prescriptions Disp Refills    sertraline (ZOLOFT) 25 MG tablet [Pharmacy Med Name: SERTRALINE HCL 25 MG TABLET] 30 tablet 0     Sig: Take 1 tablet by mouth daily Rx refill request   Levo 88mg   Qty 90     Atorvastatin 40mg   Qty 90     Metformin 500mg   Qty 180

## 2025-06-23 NOTE — TELEPHONE ENCOUNTER
"Medicine Refill Request    Last Office Visit: 3/6/2025   Last Consult Visit: Visit date not found  Last Telemedicine Visit: Visit date not found    Next Appointment: 7/18/2025      Current Outpatient Medications:     acetaminophen (TYLENOL) 500 mg tablet, Take 1,000 mg by mouth every 6 (six) hours as needed for mild pain., Disp: , Rfl:     atorvastatin (LIPITOR) 40 mg tablet, Take 1 tablet (40 mg total) by mouth daily. (Patient taking differently: Take 40 mg by mouth nightly.), Disp: 90 tablet, Rfl: 1    buPROPion XL (WELLBUTRIN XL) 150 mg 24 hr tablet, Take 150 mg by mouth daily., Disp: , Rfl:     escitalopram (LEXAPRO) 20 mg tablet, Take 20 mg by mouth nightly., Disp: , Rfl: 5    levothyroxine (SYNTHROID) 88 mcg tablet, TAKE 1 TABLET (88 MCG TOTAL) BY MOUTH DAILY., Disp: 30 tablet, Rfl: 2    metFORMIN (GLUCOPHAGE) 500 mg tablet, Take 1 tablet (500 mg total) by mouth 2 (two) times a day., Disp: 180 tablet, Rfl: 1    naproxen (NAPROSYN) 500 mg tablet, Take 1 tablet (500 mg total) by mouth 2 (two) times a day with meals., Disp: 30 tablet, Rfl: 0    VITAMIN B COMPLEX ORAL, Take 1 tablet by mouth daily., Disp: , Rfl:     BP Readings from Last 3 Encounters:   03/06/25 126/80   01/29/25 128/74   01/16/25 138/77       Recent Lab results:  No results found for: \"CHOL\", No results found for: \"HDL\", No results found for: \"LDLCALC\", No results found for: \"TRIG\"     Lab Results   Component Value Date    GLUCOSE 133 (H) 01/16/2025   , No results found for: \"HGBA1C\"      Lab Results   Component Value Date    CREATININE 1.5 (H) 01/16/2025       No results found for: \"TSH\"      No results found for: \"HGBA1C\"    "

## 2025-06-24 RX ORDER — METFORMIN HYDROCHLORIDE 500 MG/1
500 TABLET ORAL 2 TIMES DAILY
Qty: 180 TABLET | Refills: 1 | Status: SHIPPED | OUTPATIENT
Start: 2025-06-24 | End: 2025-12-21

## 2025-06-24 RX ORDER — ATORVASTATIN CALCIUM 40 MG/1
40 TABLET, FILM COATED ORAL NIGHTLY
Qty: 90 TABLET | Refills: 1 | Status: SHIPPED | OUTPATIENT
Start: 2025-06-24 | End: 2025-12-21

## 2025-06-24 RX ORDER — LEVOTHYROXINE SODIUM 88 UG/1
88 TABLET ORAL DAILY
Qty: 180 TABLET | Refills: 1 | Status: SHIPPED | OUTPATIENT
Start: 2025-06-24

## 2025-07-18 ENCOUNTER — OFFICE VISIT (OUTPATIENT)
Dept: PRIMARY CARE | Facility: CLINIC | Age: 55
End: 2025-07-18
Payer: COMMERCIAL

## 2025-07-18 ENCOUNTER — TELEPHONE (OUTPATIENT)
Dept: PRIMARY CARE | Facility: CLINIC | Age: 55
End: 2025-07-18

## 2025-07-18 VITALS
WEIGHT: 235 LBS | OXYGEN SATURATION: 96 % | DIASTOLIC BLOOD PRESSURE: 76 MMHG | HEIGHT: 67 IN | TEMPERATURE: 97.8 F | SYSTOLIC BLOOD PRESSURE: 120 MMHG | HEART RATE: 98 BPM | BODY MASS INDEX: 36.88 KG/M2

## 2025-07-18 DIAGNOSIS — Z11.59 NEED FOR HEPATITIS C SCREENING TEST: ICD-10-CM

## 2025-07-18 DIAGNOSIS — E11.9 TYPE 2 DIABETES MELLITUS WITHOUT COMPLICATION, WITHOUT LONG-TERM CURRENT USE OF INSULIN (CMS/HCC): ICD-10-CM

## 2025-07-18 DIAGNOSIS — C18.7 CANCER OF SIGMOID (CMS/HCC): ICD-10-CM

## 2025-07-18 DIAGNOSIS — F33.1 MODERATE EPISODE OF RECURRENT MAJOR DEPRESSIVE DISORDER (CMS/HCC): ICD-10-CM

## 2025-07-18 DIAGNOSIS — Z00.00 WELL ADULT EXAM: Primary | ICD-10-CM

## 2025-07-18 PROBLEM — R13.10 DYSPHAGIA: Status: ACTIVE | Noted: 2025-07-18

## 2025-07-18 PROBLEM — G44.52 NEW DAILY PERSISTENT HEADACHE: Status: ACTIVE | Noted: 2025-07-18

## 2025-07-18 PROBLEM — E11.65 HYPERGLYCEMIA DUE TO TYPE 2 DIABETES MELLITUS (CMS/HCC): Status: ACTIVE | Noted: 2025-07-18

## 2025-07-18 PROBLEM — K21.9 GASTRO-ESOPHAGEAL REFLUX DISEASE WITHOUT ESOPHAGITIS: Status: ACTIVE | Noted: 2022-02-14

## 2025-07-18 PROBLEM — R07.89 ATYPICAL CHEST PAIN: Status: RESOLVED | Noted: 2019-10-04 | Resolved: 2025-07-18

## 2025-07-18 PROBLEM — E78.00 HIGH CHOLESTEROL: Status: ACTIVE | Noted: 2025-07-18

## 2025-07-18 PROBLEM — R06.09 DYSPNEA ON EXERTION: Status: RESOLVED | Noted: 2019-10-04 | Resolved: 2025-07-18

## 2025-07-18 PROBLEM — F33.42 RECURRENT MAJOR DEPRESSION IN FULL REMISSION (CMS/HCC): Status: ACTIVE | Noted: 2025-07-18

## 2025-07-18 PROBLEM — K21.9 GASTROESOPHAGEAL REFLUX DISEASE: Status: ACTIVE | Noted: 2022-02-14

## 2025-07-18 PROBLEM — F41.8 MIXED ANXIETY AND DEPRESSIVE DISORDER: Status: ACTIVE | Noted: 2025-07-18

## 2025-07-18 PROBLEM — E66.9 OBESITY: Status: ACTIVE | Noted: 2019-10-04

## 2025-07-18 PROBLEM — R07.89 ATYPICAL CHEST PAIN: Status: ACTIVE | Noted: 2019-10-04

## 2025-07-18 PROBLEM — E55.9 VITAMIN D DEFICIENCY: Status: ACTIVE | Noted: 2024-12-24

## 2025-07-18 PROBLEM — R26.9 ABNORMAL GAIT: Status: RESOLVED | Noted: 2025-07-18 | Resolved: 2025-07-18

## 2025-07-18 PROBLEM — R26.9 ABNORMAL GAIT: Status: ACTIVE | Noted: 2025-07-18

## 2025-07-18 PROBLEM — F41.1 GENERALIZED ANXIETY DISORDER: Status: ACTIVE | Noted: 2025-07-18

## 2025-07-18 PROBLEM — M72.2 PLANTAR FASCIITIS: Status: ACTIVE | Noted: 2022-02-14

## 2025-07-18 PROBLEM — M54.2 CERVICALGIA: Status: ACTIVE | Noted: 2025-07-18

## 2025-07-18 PROBLEM — R06.09 DYSPNEA ON EXERTION: Status: ACTIVE | Noted: 2019-10-04

## 2025-07-18 PROBLEM — E03.9 HYPOTHYROIDISM: Status: ACTIVE | Noted: 2020-01-01

## 2025-07-18 PROBLEM — M72.2 PLANTAR FASCIAL FIBROMATOSIS: Status: ACTIVE | Noted: 2025-07-18

## 2025-07-18 PROBLEM — K21.9 GASTRO-ESOPHAGEAL REFLUX DISEASE WITHOUT ESOPHAGITIS: Status: ACTIVE | Noted: 2025-07-18

## 2025-07-18 PROBLEM — R13.10 DYSPHAGIA: Status: RESOLVED | Noted: 2025-07-18 | Resolved: 2025-07-18

## 2025-07-18 PROBLEM — R26.89 IMPAIRMENT OF BALANCE: Status: ACTIVE | Noted: 2025-07-18

## 2025-07-18 PROCEDURE — 99396 PREV VISIT EST AGE 40-64: CPT | Performed by: FAMILY MEDICINE

## 2025-07-18 PROCEDURE — 3008F BODY MASS INDEX DOCD: CPT | Performed by: FAMILY MEDICINE

## 2025-07-18 RX ORDER — PROGESTERONE 200 MG/1
CAPSULE ORAL
COMMUNITY

## 2025-07-18 RX ORDER — CARTILAGE/COLLAGEN/BOR/HYALUR 40-10-5 MG
TABLET ORAL
COMMUNITY

## 2025-07-18 RX ORDER — ESTRADIOL 0.04 MG/D
FILM, EXTENDED RELEASE TRANSDERMAL
COMMUNITY
Start: 2025-06-24

## 2025-07-18 ASSESSMENT — VISUAL ACUITY
OD_CC: 20/25
OS_CC: 20/30

## 2025-07-18 ASSESSMENT — ENCOUNTER SYMPTOMS
DYSPHORIC MOOD: 1
JOINT SWELLING: 0
FEVER: 0
DIFFICULTY URINATING: 0
WEAKNESS: 0
HEMATURIA: 0
SHORTNESS OF BREATH: 0
ABDOMINAL PAIN: 0
SORE THROAT: 0
NAUSEA: 0
EYE PAIN: 0
PALPITATIONS: 0
TROUBLE SWALLOWING: 0
CHILLS: 0
COUGH: 0
VOMITING: 0

## 2025-07-18 ASSESSMENT — PATIENT HEALTH QUESTIONNAIRE - PHQ9
SUM OF ALL RESPONSES TO PHQ9 QUESTIONS 1 & 2: 2
SUM OF ALL RESPONSES TO PHQ QUESTIONS 1-9: 6

## 2025-07-18 NOTE — PROGRESS NOTES
Consent obtained from patient and all parties present in the room? yes    I have obtained the consent of everyone present in the room to make an audio recording of this visit to assist me in documenting the encounter in the EMR.      Mary Anne Rosado MD    [facility] in [address] [address]   P: [phone]  F: [phone]       Subjective     Patient ID: Mercy Vizcarra, : 1970 is a 54 y.o. female who presents for Annual Exam    History of Present Illness  The patient presents for a routine wellness exam.    Mental health  - Reports feeling stressed due to work, she works for the [employer]  - Finds it difficult to start her day sometimes struggles to get out of bed.  - Does not feel like doing anything when she gets home  - She reports decreased motivation to go out and do things, including socializing during her recent family trip.  She reports that she spent a good portion of the trip in her room sleeping  - Has been showering less frequently than usual  - Believes her mindset might change if she retires  - She is compliant with taking her Wellbutrin daily  - She also wonders if her levothyroxine dose may need to be adjusted because she has had mood shifts in the past when her hypothyroidism was not well-controlled    Diabetes  - Last A1c level was 7.2%, attributed to high sugar intake  - Interested in working with a nutritionist but struggling to find time for appointments  - Has had to cancel her eye check due to work conflicts  - She is compliant with taking her metformin    Preventative care  - She had a mammogram in 2025, no evidence of malignancy  - Up-to-date with cervical cancer screening.   - Last colonoscopy was in , she will next be due in .    Diet: Reports consuming fast food, burgers, fries, soda, sandwiches, salads, soups, and occasionally cereal. Drinks oats overnight with 20g of protein for breakfast about 50% of  the time.  Exercise: No formal exercise currently.    Social History:  Alcohol: Consumes alcohol once every six months, approximately half a glass.  Tobacco: No current tobacco use.  Household: Lives alone but regularly talks to her daughters.    The following have been reviewed and updated as appropriate in this visit:   Tobacco  Allergies  Meds  Med Hx  Surg Hx  Fam Hx       Past Medical History:   Diagnosis Date    Cancer of sigmoid (CMS/HCC)     T3N2    Cervical radiculopathy     has received steroid epidurals    Colon cancer (CMS/HCC)     follows DR Almanza at [facility]    High cholesterol     Hypothyroidism     Kidney stone 2025    left ureter    Kidney stones     Type 2 diabetes mellitus (CMS/HCC) 2021    UTI (urinary tract infection)      Past Surgical History   Procedure Laterality Date     section      Colon surgery  05/15/2013    Colonoscopy to the base of the cecum. Limited ileoscopy, biopsy, flat area of the base of the cecum.     Colon surgery  2015    Colonoscopy to the base of the cecum, normal anastomosis.    Colon surgery  2011    Colonoscopy to the base of the cecum    Colon surgery  07/15/2009    Colonoscopy to the base of the cecum    Colon surgery  2008    Colonoscopy to the base of the cecum    Colon surgery  2008    Exploratory laparoscopy with laparoscopic Tequila's resection, takedown of the splenic flexure, resection of residual sigmoid colon,portion of rectum with descending colorectal anastomosis.     Colon surgery  2007    Total colonoscopy via stoma and flexible sigmoidoscopy per rectum with cold forceps biopsy.     Colon surgery  2017    Colonoscopy to the base of the cecum    Colonoscopy  2024    Dr Almanza    COLONOSCOPY N/A 12/15/2021    Performed by Brown Almanza MD at [facility]    Colonoscopy N/A 2019    Performed by Brown Almanza MD at [facility]    CYSTO, URETEROSCOPY, LASER LITHOTRIPSY, STENT PLACEMENT,   RETROGRADE PYELOGRAM Left 1/29/2025    Performed by Leo Reed MD at  OR    Cystoscopy  2021    x3    FLEXIBLE COLONOSCOPY PROXIMAL TO SPLENIC FLEXURE WITH BIOPSY N/A 5/22/2024    Performed by Brown Almanza MD at [facility]    Lithotripsy      [place] tooth extraction       Allergies   Allergen Reactions    Iodinated Contrast Media Rash and Hives     Pt had a rash after CT dye. Pt  was prepped with prednisone for her last CT with dye. Pt claims that she had poison ivy at the time of having the CT dye and was not actually allergic. Explained to the pt the dye allergy was documented in our system and that she will require prednisone prep prior to CT scans with dye.    No prep (per provider and patient request) and no reaction to CT abd/pelvis done on 1/16/2025 CT Tech [facility]    Penicillins Rash and Hives    Sulfa (Sulfonamide Antibiotics) Rash, Hives and Other (see comments)     from enrollment package    Gadolinium-Containing Contrast Media Other (see comments)    Adhesive Tape-Silicones Rash       Current Outpatient Medications:     atorvastatin (LIPITOR) 40 mg tablet, Take 1 tablet (40 mg total) by mouth nightly., Disp: 90 tablet, Rfl: 1    buPROPion XL (WELLBUTRIN XL) 150 mg 24 hr tablet, Take 150 mg by mouth daily., Disp: , Rfl:     cyanocobalamin, vitamin B-12, (VITAMIN B-12) 500 mcg lozenge, Vitamin B12, Disp: , Rfl:     escitalopram (LEXAPRO) 20 mg tablet, Take 20 mg by mouth nightly., Disp: , Rfl: 5    estradioL (VIVELLE-DOT) 0.0375 mg/24 hr semiweekly patch, apply 1 patch twice a week by transdermal route., Disp: , Rfl:     levothyroxine (SYNTHROID) 88 mcg tablet, Take 1 tablet (88 mcg total) by mouth daily., Disp: 180 tablet, Rfl: 1    metFORMIN (GLUCOPHAGE) 500 mg tablet, Take 1 tablet (500 mg total) by mouth 2 (two) times a day., Disp: 180 tablet, Rfl: 1    progesterone (PROMETRIUM) 200 mg capsule, TAKE 1 CAPSULE EVERY DAY BY ORAL ROUTE AT BEDTIME FOR 12 DAYS., Disp: , Rfl:   Medications Discontinued  During This Encounter   Medication Reason    acetaminophen (TYLENOL) 500 mg tablet Stop Taking at Discharge    naproxen (NAPROSYN) 500 mg tablet Stop Taking at Discharge    VITAMIN B COMPLEX ORAL Stop Taking at Discharge       Review of Systems   Constitutional:  Negative for chills and fever.   HENT:  Negative for ear pain, sore throat and trouble swallowing.    Eyes:  Negative for pain and visual disturbance.   Respiratory:  Negative for cough and shortness of breath.    Cardiovascular:  Negative for chest pain and palpitations.   Gastrointestinal:  Negative for abdominal pain, nausea and vomiting.   Genitourinary:  Negative for difficulty urinating and hematuria.   Musculoskeletal:  Negative for joint swelling.   Skin:  Negative for rash.   Neurological:  Negative for weakness.   Psychiatric/Behavioral:  Positive for dysphoric mood. Negative for suicidal ideas.        Objective   Vitals:    07/18/25 0936   BP: 120/76   Pulse: 98   Temp: 36.6 °C (97.8 °F)   SpO2: 96%     Wt Readings from Last 3 Encounters:   07/18/25 107 kg (235 lb)   03/06/25 108 kg (238 lb)   01/29/25 106 kg (233 lb)     BP Readings from Last 3 Encounters:   07/18/25 120/76   03/06/25 126/80   01/29/25 128/74     Body mass index is 36.53 kg/m².    Physical Exam  Vitals and nursing note reviewed.   Constitutional:       General: She is not in acute distress.  Cardiovascular:      Rate and Rhythm: Normal rate and regular rhythm.      Heart sounds: S1 normal and S2 normal. No murmur heard.     No friction rub. No gallop.   Pulmonary:      Effort: Pulmonary effort is normal.      Breath sounds: Normal breath sounds. No wheezing, rhonchi or rales.   Musculoskeletal:      Right lower leg: No edema.      Left lower leg: No edema.   Neurological:      General: No focal deficit present.      Mental Status: She is alert.   Psychiatric:      Comments: Depressed affect         Lab Results   Component Value Date    WBC 10.65 (H) 01/16/2025    HGB 14.9  01/16/2025     01/16/2025     Lab Results   Component Value Date     01/16/2025    K 4.4 01/16/2025    CA 9.6 08/13/2021    CREATININE 1.5 (H) 01/16/2025    BUN 23 01/16/2025    EGFR 40.7 (L) 01/16/2025     Lab Results   Component Value Date    AST 22 01/16/2025    ALT 30 01/16/2025     Results       Assessment & Plan  1. Depression: Uncontrolled.  - Discussed increasing her Wellbutrin XL dose from 150 mg to 300 mg daily.    - She prefers to wait to see what her thyroid numbers are prior to changing her dose of Wellbutrin  - If no adjustment to her levothyroxine is needed she is agreeable to trying an increased dose of Wellbutrin    2. Diabetes mellitus: Suboptimal control.  - Order comprehensive labs including A1c, microalbumin, creatinine, cholesterol levels, CBC, CMP, and hepatitis C screening.  - If A1c remains above 7% her diabetic medications will need to be adjusted.  Would likely consider adding an SGLT2 inhibitor as this would also provide some kidney protection.  - Also will may consider adding lisinopril 2.5 mg in the future for nephro protection.    3. Hypothyroidism.  - Check thyroid levels as part of comprehensive lab work.  - Continue levothyroxine 88 mcg for now    4.  Health maintenance  - Counseled patient to follow a healthy well balanced diet. Counseled the patient to engage in a minimum of 150 minutes of moderate intensity physical activity and 2 days of muscle strengthening activity weekly.  - All vaccines are up-to-date  - Up-to-date with breast cancer and cervical cancer screening    5.  Colon cancer: In remission  - Continue current management, she will follow-up with her GI/oncology team as directed.  Diagnoses and all orders for this visit:    Well adult exam (Primary)  -     Lipid panel; Future  -     CBC and Differential; Future  -     Comprehensive metabolic panel; Future  -     TSH w reflex FT4; Future    Cancer of sigmoid (CMS/HCC)    Type 2 diabetes mellitus without  complication, without long-term current use of insulin (CMS/HCC)  -     Hemoglobin A1c; Future  -     Microalbumin/Creatinine Ur Random; Future    Moderate episode of recurrent major depressive disorder (CMS/HCC)    Need for hepatitis C screening test  -     Hepatitis C antibody; Future       Mary Anne Rosado MD      07/18/2025

## 2025-07-18 NOTE — LETTER
[facility]  Preventive Exam Fax Back Request   Date:25         RE:  Mercy Vizcarra (: 1970)     We are reaching out to you because our mutual patient, Mercy Vizcarra (: 1970), reported they had the preventive procedure(s) indicated below performed at your facility:     Cervical Cancer Screening (Pap Smear)      Please fax the most recent results to our office with this Cover Sheet.  (If no results are found, please check the appropriate box below)  ? - No results found  ? - Results attached    Please fax to: FAX# [phone]    THANK YOU FOR YOUR PARTNERSHIP IN   PROVIDING EXCELLENT CARE TO OUR PATIENTS!      This request is confidential and intended only for the use of the individual or entity to which it is addressed.  It may contain information that is privileged, confidential, and exempt from disclosure.  if the reader of this message is not the intended recipient, you are hereby notified that any dissemination, distribution, or copying of this communication is strictly prohibited.  If you believe you have received this communication in error, please notify us immediately at [phone]

## 2025-07-21 LAB
ALBUMIN SERPL-MCNC: 4.4 G/DL (ref 3.8–4.9)
ALP SERPL-CCNC: 147 IU/L (ref 44–121)
ALT SERPL-CCNC: 36 IU/L (ref 0–32)
AST SERPL-CCNC: 25 IU/L (ref 0–40)
BASOPHILS # BLD AUTO: 0 X10E3/UL (ref 0–0.2)
BASOPHILS NFR BLD AUTO: 0 %
BILIRUB SERPL-MCNC: 0.5 MG/DL (ref 0–1.2)
BUN SERPL-MCNC: 14 MG/DL (ref 6–24)
BUN/CREAT SERPL: 15 (ref 9–23)
CALCIUM SERPL-MCNC: 9.6 MG/DL (ref 8.7–10.2)
CHLORIDE SERPL-SCNC: 106 MMOL/L (ref 96–106)
CHOLEST SERPL-MCNC: 137 MG/DL (ref 100–199)
CO2 SERPL-SCNC: 23 MMOL/L (ref 20–29)
CREAT SERPL-MCNC: 0.92 MG/DL (ref 0.57–1)
EGFRCR SERPLBLD CKD-EPI 2021: 73 ML/MIN/1.73
EOSINOPHIL # BLD AUTO: 0.1 X10E3/UL (ref 0–0.4)
EOSINOPHIL NFR BLD AUTO: 2 %
ERYTHROCYTE [DISTWIDTH] IN BLOOD BY AUTOMATED COUNT: 12.9 % (ref 11.7–15.4)
GLOBULIN SER CALC-MCNC: 2.2 G/DL (ref 1.5–4.5)
GLUCOSE SERPL-MCNC: 146 MG/DL (ref 70–99)
HBA1C MFR BLD: 7.1 % (ref 4.8–5.6)
HCT VFR BLD AUTO: 42.3 % (ref 34–46.6)
HDLC SERPL-MCNC: 44 MG/DL
HGB BLD-MCNC: 13.8 G/DL (ref 11.1–15.9)
IMM GRANULOCYTES # BLD AUTO: 0 X10E3/UL (ref 0–0.1)
IMM GRANULOCYTES NFR BLD AUTO: 0 %
LDLC SERPL CALC-MCNC: 70 MG/DL (ref 0–99)
LYMPHOCYTES # BLD AUTO: 2.1 X10E3/UL (ref 0.7–3.1)
LYMPHOCYTES NFR BLD AUTO: 28 %
MCH RBC QN AUTO: 30.7 PG (ref 26.6–33)
MCHC RBC AUTO-ENTMCNC: 32.6 G/DL (ref 31.5–35.7)
MCV RBC AUTO: 94 FL (ref 79–97)
MONOCYTES # BLD AUTO: 0.6 X10E3/UL (ref 0.1–0.9)
MONOCYTES NFR BLD AUTO: 7 %
NEUTROPHILS # BLD AUTO: 4.8 X10E3/UL (ref 1.4–7)
NEUTROPHILS NFR BLD AUTO: 63 %
PLATELET # BLD AUTO: 293 X10E3/UL (ref 150–450)
POTASSIUM SERPL-SCNC: 4.7 MMOL/L (ref 3.5–5.2)
PROT SERPL-MCNC: 6.6 G/DL (ref 6–8.5)
RBC # BLD AUTO: 4.5 X10E6/UL (ref 3.77–5.28)
SODIUM SERPL-SCNC: 144 MMOL/L (ref 134–144)
T4 FREE SERPL-MCNC: 1.16 NG/DL (ref 0.82–1.77)
TRIGL SERPL-MCNC: 131 MG/DL (ref 0–149)
TSH SERPL DL<=0.005 MIU/L-ACNC: 1.45 UIU/ML (ref 0.45–4.5)
VLDLC SERPL CALC-MCNC: 23 MG/DL (ref 5–40)
WBC # BLD AUTO: 7.7 X10E3/UL (ref 3.4–10.8)

## 2025-07-23 LAB
ALBUMIN/CREAT UR: 13 MG/G CREAT (ref 0–29)
CREAT UR-MCNC: 90.6 MG/DL
MICROALBUMIN UR-MCNC: 12.2 UG/ML

## (undated) DEVICE — CATHETER URETERAL L50CM OD5FR .040IN STANDARD POLYURETHANE 2

## (undated) DEVICE — SOLN IRRIG STER WATER 1000ML

## (undated) DEVICE — GUIDEWIRE UROLOGICAL L145CM DIA0.038IN TIP L15CM BENTSON STA

## (undated) DEVICE — FIBER LASER 272UM OPTICAL SINGLE USE

## (undated) DEVICE — GLOVE SZ 7.5 PROTEXIS CLASSIC LATEX

## (undated) DEVICE — SOLN IRRIG .9%SOD 3L

## (undated) DEVICE — ISOVUE-M 200 41% VIAL

## (undated) DEVICE — GOWN SIRUS POLYRNF BRTHSLV XL 30/CS

## (undated) DEVICE — CAP ENDOSCOPE UNIVERSAL BLUE SILICONE SELF SEALING DISPOSABL

## (undated) DEVICE — FORCEP COLD RADIAL JAW

## (undated) DEVICE — SOLN IV 0.9% NSS 1000ML

## (undated) DEVICE — MOUTHPIECE 60FR ENDO BITEBLOCK

## (undated) DEVICE — SLEEVE SCD COMFORT KNEE LENGTH MED

## (undated) DEVICE — HEEL  ELBOW PROTECTOR

## (undated) DEVICE — MANIFOLD SINGLE PORT NEPTUNE

## (undated) DEVICE — PACK RFID CYSTOSCOPY

## (undated) DEVICE — PACK OR TOWEL

## (undated) DEVICE — Device

## (undated) DEVICE — WIRE GUIDE SENSOR DUAL FLEX .038

## (undated) DEVICE — BAG URINARY DRAINAGE 4 LITER